# Patient Record
Sex: FEMALE | Race: WHITE | NOT HISPANIC OR LATINO | Employment: OTHER | URBAN - METROPOLITAN AREA
[De-identification: names, ages, dates, MRNs, and addresses within clinical notes are randomized per-mention and may not be internally consistent; named-entity substitution may affect disease eponyms.]

---

## 2017-01-16 ENCOUNTER — HOSPITAL ENCOUNTER (OUTPATIENT)
Dept: LAB | Facility: MEDICAL CENTER | Age: 82
End: 2017-01-16
Attending: NURSE PRACTITIONER
Payer: MEDICARE

## 2017-01-16 LAB
ALBUMIN SERPL BCP-MCNC: 3.9 G/DL (ref 3.2–4.9)
ALBUMIN/GLOB SERPL: 1.3 G/DL
ALP SERPL-CCNC: 58 U/L (ref 30–99)
ALT SERPL-CCNC: 14 U/L (ref 2–50)
ANION GAP SERPL CALC-SCNC: 5 MMOL/L (ref 0–11.9)
AST SERPL-CCNC: 16 U/L (ref 12–45)
BILIRUB SERPL-MCNC: 0.4 MG/DL (ref 0.1–1.5)
BUN SERPL-MCNC: 15 MG/DL (ref 8–22)
CALCIUM SERPL-MCNC: 9 MG/DL (ref 8.5–10.5)
CHLORIDE SERPL-SCNC: 104 MMOL/L (ref 96–112)
CO2 SERPL-SCNC: 29 MMOL/L (ref 20–33)
CREAT SERPL-MCNC: 0.79 MG/DL (ref 0.5–1.4)
GLOBULIN SER CALC-MCNC: 2.9 G/DL (ref 1.9–3.5)
GLUCOSE SERPL-MCNC: 89 MG/DL (ref 65–99)
POTASSIUM SERPL-SCNC: 3.8 MMOL/L (ref 3.6–5.5)
PROT SERPL-MCNC: 6.8 G/DL (ref 6–8.2)
SODIUM SERPL-SCNC: 138 MMOL/L (ref 135–145)

## 2017-01-16 PROCEDURE — 80053 COMPREHEN METABOLIC PANEL: CPT

## 2017-01-16 PROCEDURE — 36415 COLL VENOUS BLD VENIPUNCTURE: CPT

## 2017-04-04 ENCOUNTER — HOSPITAL ENCOUNTER (OUTPATIENT)
Dept: RADIOLOGY | Facility: MEDICAL CENTER | Age: 82
End: 2017-04-04
Attending: NURSE PRACTITIONER
Payer: MEDICARE

## 2017-04-04 DIAGNOSIS — Z12.31 VISIT FOR SCREENING MAMMOGRAM: ICD-10-CM

## 2017-04-04 PROCEDURE — 77063 BREAST TOMOSYNTHESIS BI: CPT

## 2017-07-13 ENCOUNTER — HOSPITAL ENCOUNTER (OUTPATIENT)
Dept: LAB | Facility: MEDICAL CENTER | Age: 82
End: 2017-07-13
Attending: NURSE PRACTITIONER
Payer: MEDICARE

## 2017-07-13 LAB
ALBUMIN SERPL BCP-MCNC: 3.9 G/DL (ref 3.2–4.9)
ALBUMIN/GLOB SERPL: 1.2 G/DL
ALP SERPL-CCNC: 59 U/L (ref 30–99)
ALT SERPL-CCNC: 13 U/L (ref 2–50)
ANION GAP SERPL CALC-SCNC: 9 MMOL/L (ref 0–11.9)
AST SERPL-CCNC: 16 U/L (ref 12–45)
BASOPHILS # BLD AUTO: 0.9 % (ref 0–1.8)
BASOPHILS # BLD: 0.05 K/UL (ref 0–0.12)
BILIRUB SERPL-MCNC: 0.6 MG/DL (ref 0.1–1.5)
BUN SERPL-MCNC: 23 MG/DL (ref 8–22)
CALCIUM SERPL-MCNC: 9.3 MG/DL (ref 8.5–10.5)
CHLORIDE SERPL-SCNC: 102 MMOL/L (ref 96–112)
CHOLEST SERPL-MCNC: 167 MG/DL (ref 100–199)
CO2 SERPL-SCNC: 27 MMOL/L (ref 20–33)
CREAT SERPL-MCNC: 0.85 MG/DL (ref 0.5–1.4)
CREAT UR-MCNC: 91.1 MG/DL
EOSINOPHIL # BLD AUTO: 0.33 K/UL (ref 0–0.51)
EOSINOPHIL NFR BLD: 5.6 % (ref 0–6.9)
ERYTHROCYTE [DISTWIDTH] IN BLOOD BY AUTOMATED COUNT: 39.1 FL (ref 35.9–50)
GFR SERPL CREATININE-BSD FRML MDRD: >60 ML/MIN/1.73 M 2
GLOBULIN SER CALC-MCNC: 3.3 G/DL (ref 1.9–3.5)
GLUCOSE SERPL-MCNC: 103 MG/DL (ref 65–99)
HCT VFR BLD AUTO: 40.1 % (ref 37–47)
HDLC SERPL-MCNC: 51 MG/DL
HGB BLD-MCNC: 13.6 G/DL (ref 12–16)
IMM GRANULOCYTES # BLD AUTO: 0.02 K/UL (ref 0–0.11)
IMM GRANULOCYTES NFR BLD AUTO: 0.3 % (ref 0–0.9)
LDLC SERPL CALC-MCNC: 84 MG/DL
LYMPHOCYTES # BLD AUTO: 1.43 K/UL (ref 1–4.8)
LYMPHOCYTES NFR BLD: 24.3 % (ref 22–41)
MCH RBC QN AUTO: 31.3 PG (ref 27–33)
MCHC RBC AUTO-ENTMCNC: 33.9 G/DL (ref 33.6–35)
MCV RBC AUTO: 92.4 FL (ref 81.4–97.8)
MICROALBUMIN UR-MCNC: <0.7 MG/DL
MICROALBUMIN/CREAT UR: NORMAL MG/G (ref 0–30)
MONOCYTES # BLD AUTO: 0.38 K/UL (ref 0–0.85)
MONOCYTES NFR BLD AUTO: 6.5 % (ref 0–13.4)
NEUTROPHILS # BLD AUTO: 3.67 K/UL (ref 2–7.15)
NEUTROPHILS NFR BLD: 62.4 % (ref 44–72)
NRBC # BLD AUTO: 0 K/UL
NRBC BLD AUTO-RTO: 0 /100 WBC
PLATELET # BLD AUTO: 172 K/UL (ref 164–446)
PMV BLD AUTO: 10.2 FL (ref 9–12.9)
POTASSIUM SERPL-SCNC: 3.7 MMOL/L (ref 3.6–5.5)
PROT SERPL-MCNC: 7.2 G/DL (ref 6–8.2)
RBC # BLD AUTO: 4.34 M/UL (ref 4.2–5.4)
SODIUM SERPL-SCNC: 138 MMOL/L (ref 135–145)
T3FREE SERPL-MCNC: 2.66 PG/ML (ref 2.4–4.2)
T4 FREE SERPL-MCNC: 0.59 NG/DL (ref 0.53–1.43)
TRIGL SERPL-MCNC: 162 MG/DL (ref 0–149)
TSH SERPL DL<=0.005 MIU/L-ACNC: 1.32 UIU/ML (ref 0.3–3.7)
WBC # BLD AUTO: 5.9 K/UL (ref 4.8–10.8)

## 2017-07-13 PROCEDURE — 84439 ASSAY OF FREE THYROXINE: CPT

## 2017-07-13 PROCEDURE — 85025 COMPLETE CBC W/AUTO DIFF WBC: CPT

## 2017-07-13 PROCEDURE — 84443 ASSAY THYROID STIM HORMONE: CPT

## 2017-07-13 PROCEDURE — 80053 COMPREHEN METABOLIC PANEL: CPT

## 2017-07-13 PROCEDURE — 82043 UR ALBUMIN QUANTITATIVE: CPT

## 2017-07-13 PROCEDURE — 84481 FREE ASSAY (FT-3): CPT

## 2017-07-13 PROCEDURE — 80061 LIPID PANEL: CPT

## 2017-07-13 PROCEDURE — 82570 ASSAY OF URINE CREATININE: CPT

## 2017-07-13 PROCEDURE — 36415 COLL VENOUS BLD VENIPUNCTURE: CPT

## 2017-08-10 ENCOUNTER — HOSPITAL ENCOUNTER (OUTPATIENT)
Dept: RADIOLOGY | Facility: REHABILITATION | Age: 82
End: 2017-08-10
Attending: PHYSICAL MEDICINE & REHABILITATION
Payer: MEDICARE

## 2017-08-10 ENCOUNTER — HOSPITAL ENCOUNTER (OUTPATIENT)
Dept: PAIN MANAGEMENT | Facility: REHABILITATION | Age: 82
End: 2017-08-10
Attending: PHYSICAL MEDICINE & REHABILITATION
Payer: MEDICARE

## 2017-08-10 VITALS
HEART RATE: 58 BPM | DIASTOLIC BLOOD PRESSURE: 74 MMHG | TEMPERATURE: 98.6 F | BODY MASS INDEX: 21.93 KG/M2 | SYSTOLIC BLOOD PRESSURE: 161 MMHG | WEIGHT: 131.61 LBS | HEIGHT: 65 IN | RESPIRATION RATE: 16 BRPM | OXYGEN SATURATION: 97 %

## 2017-08-10 PROCEDURE — 700117 HCHG RX CONTRAST REV CODE 255

## 2017-08-10 PROCEDURE — 700111 HCHG RX REV CODE 636 W/ 250 OVERRIDE (IP)

## 2017-08-10 PROCEDURE — 700101 HCHG RX REV CODE 250

## 2017-08-10 PROCEDURE — 62323 NJX INTERLAMINAR LMBR/SAC: CPT

## 2017-08-10 RX ORDER — METHYLPREDNISOLONE ACETATE 80 MG/ML
INJECTION, SUSPENSION INTRA-ARTICULAR; INTRALESIONAL; INTRAMUSCULAR; SOFT TISSUE
Status: COMPLETED
Start: 2017-08-10 | End: 2017-08-10

## 2017-08-10 RX ORDER — METHYLPREDNISOLONE ACETATE 40 MG/ML
INJECTION, SUSPENSION INTRA-ARTICULAR; INTRALESIONAL; INTRAMUSCULAR; SOFT TISSUE
Status: COMPLETED
Start: 2017-08-10 | End: 2017-08-10

## 2017-08-10 RX ORDER — LIDOCAINE HYDROCHLORIDE 20 MG/ML
INJECTION, SOLUTION EPIDURAL; INFILTRATION; INTRACAUDAL; PERINEURAL
Status: COMPLETED
Start: 2017-08-10 | End: 2017-08-10

## 2017-08-10 RX ADMIN — LIDOCAINE HYDROCHLORIDE 2 ML: 20 INJECTION, SOLUTION EPIDURAL; INFILTRATION; INTRACAUDAL; PERINEURAL at 11:11

## 2017-08-10 RX ADMIN — METHYLPREDNISOLONE ACETATE 40 MG: 40 INJECTION, SUSPENSION INTRA-ARTICULAR; INTRALESIONAL; INTRAMUSCULAR; SOFT TISSUE at 11:17

## 2017-08-10 RX ADMIN — LIDOCAINE HYDROCHLORIDE 0.5 ML: 20 INJECTION, SOLUTION EPIDURAL; INFILTRATION; INTRACAUDAL; PERINEURAL at 11:17

## 2017-08-10 RX ADMIN — METHYLPREDNISOLONE ACETATE 80 MG: 80 INJECTION, SUSPENSION INTRA-ARTICULAR; INTRALESIONAL; INTRAMUSCULAR; SOFT TISSUE at 11:15

## 2017-08-10 RX ADMIN — IOHEXOL 1 ML: 240 INJECTION, SOLUTION INTRATHECAL; INTRAVASCULAR; INTRAVENOUS; ORAL at 11:13

## 2017-08-10 RX ADMIN — SODIUM BICARBONATE 1 MEQ: 0.2 INJECTION, SOLUTION INTRAVENOUS at 11:11

## 2017-08-10 ASSESSMENT — PAIN SCALES - GENERAL
PAINLEVEL_OUTOF10: 0
PAINLEVEL_OUTOF10: 0
PAINLEVEL_OUTOF10: 8

## 2017-08-10 NOTE — PROGRESS NOTES
Patient positioned by RN   CST  X- ray Tech. . Ankle supported on pillow. Arms supported by stool at head of the bed.

## 2017-08-10 NOTE — PROCEDURES
DATE OF SERVICE:    NAME OF PROCEDURE:  Right L5-S1 intralaminar epidural steroid injection with   120 mg of Depo-Medrol under fluoroscopic guidance.    INDICATION:  The patient is an 82-year-old patient [facility].    She has previously had epidurals with good success, most recent was in   December 2015.    Given her return of symptoms, Dr. Ontiveros has recommended a followup epidural   and her symptoms predominate on the right side, so we entered from the right   side for her today's procedure.    DESCRIPTION OF PROCEDURE:  After appropriate informed consent was obtained,   the patient was placed prone on the table.  Her skin was thoroughly cleansed   with Betadine soap x3, wiped off with sterile gauze then subcutaneous,   intramuscular, and interligamentous region was anesthetized with lidocaine.    A 3.5-inch 20-gauge Tuohy catheter was directed under fluoroscopic guidance at   the lamina.  Once the lamina was detected, the catheter was directed cephalad   medially and loss of resistance technique was used to determine the epidural   space.    EPIDUROGRAM:  1 mL of Omnipaque was placed just right of midline at L5-S1.    This was viewed on AP and lateral projection and proved to be subligamentous   with good cephalad and caudad flow, and the flow was unrestricted.    Depo-Medrol 120 mg along with 0.5 mL of 2% lidocaine was placed just right of   midline at L5-S1.    The patient tolerated the procedure without any obvious complications.  She   was referred to the recovery area, at which point she was doing well, noticing   improvement in her pain.    Hopefully, she will have an ongoing therapeutic response.    The patient will follow up in the office next week to monitor response to   injection.       ____________________________________     MD CARY SOSA / TRINITY    DD:  08/10/2017 11:52:09  DT:  08/10/2017 12:02:20    D#:  [id number]  Job#:  [id number]    cc: Jens Ontiveros MD, KAELYN FELIX MD

## 2017-08-10 NOTE — PROGRESS NOTES
Current medications reviewed with pt, see medications reconciliation form. Pt martha taking ASA or other blood thinners or anti-inflammatories.  Pt has a ride post-procedure(Pt going home via taxi).  Printed and verbal discharge instructions given to pt who verbalized understanding.

## 2018-01-12 ENCOUNTER — HOSPITAL ENCOUNTER (OUTPATIENT)
Dept: LAB | Facility: MEDICAL CENTER | Age: 83
End: 2018-01-12
Attending: FAMILY MEDICINE
Payer: MEDICARE

## 2018-01-12 LAB
ALBUMIN SERPL BCP-MCNC: 4.2 G/DL (ref 3.2–4.9)
ALBUMIN/GLOB SERPL: 1.4 G/DL
ALP SERPL-CCNC: 64 U/L (ref 30–99)
ALT SERPL-CCNC: 15 U/L (ref 2–50)
ANION GAP SERPL CALC-SCNC: 8 MMOL/L (ref 0–11.9)
AST SERPL-CCNC: 18 U/L (ref 12–45)
BASOPHILS # BLD AUTO: 0.9 % (ref 0–1.8)
BASOPHILS # BLD: 0.04 K/UL (ref 0–0.12)
BILIRUB SERPL-MCNC: 0.7 MG/DL (ref 0.1–1.5)
BUN SERPL-MCNC: 17 MG/DL (ref 8–22)
CALCIUM SERPL-MCNC: 9.5 MG/DL (ref 8.5–10.5)
CHLORIDE SERPL-SCNC: 102 MMOL/L (ref 96–112)
CHOLEST SERPL-MCNC: 204 MG/DL (ref 100–199)
CO2 SERPL-SCNC: 30 MMOL/L (ref 20–33)
CREAT SERPL-MCNC: 0.84 MG/DL (ref 0.5–1.4)
CREAT UR-MCNC: 30 MG/DL
EOSINOPHIL # BLD AUTO: 0.15 K/UL (ref 0–0.51)
EOSINOPHIL NFR BLD: 3.3 % (ref 0–6.9)
ERYTHROCYTE [DISTWIDTH] IN BLOOD BY AUTOMATED COUNT: 39.3 FL (ref 35.9–50)
EST. AVERAGE GLUCOSE BLD GHB EST-MCNC: 111 MG/DL
GLOBULIN SER CALC-MCNC: 3 G/DL (ref 1.9–3.5)
GLUCOSE SERPL-MCNC: 87 MG/DL (ref 65–99)
HBA1C MFR BLD: 5.5 % (ref 0–5.6)
HCT VFR BLD AUTO: 42.4 % (ref 37–47)
HDLC SERPL-MCNC: 67 MG/DL
HGB BLD-MCNC: 14.4 G/DL (ref 12–16)
IMM GRANULOCYTES # BLD AUTO: 0.01 K/UL (ref 0–0.11)
IMM GRANULOCYTES NFR BLD AUTO: 0.2 % (ref 0–0.9)
LDLC SERPL CALC-MCNC: 102 MG/DL
LYMPHOCYTES # BLD AUTO: 1.44 K/UL (ref 1–4.8)
LYMPHOCYTES NFR BLD: 32.1 % (ref 22–41)
MCH RBC QN AUTO: 32.2 PG (ref 27–33)
MCHC RBC AUTO-ENTMCNC: 34 G/DL (ref 33.6–35)
MCV RBC AUTO: 94.9 FL (ref 81.4–97.8)
MICROALBUMIN UR-MCNC: <0.7 MG/DL
MICROALBUMIN/CREAT UR: NORMAL MG/G (ref 0–30)
MONOCYTES # BLD AUTO: 0.36 K/UL (ref 0–0.85)
MONOCYTES NFR BLD AUTO: 8 % (ref 0–13.4)
NEUTROPHILS # BLD AUTO: 2.49 K/UL (ref 2–7.15)
NEUTROPHILS NFR BLD: 55.5 % (ref 44–72)
NRBC # BLD AUTO: 0 K/UL
NRBC BLD-RTO: 0 /100 WBC
PLATELET # BLD AUTO: 173 K/UL (ref 164–446)
PMV BLD AUTO: 10.5 FL (ref 9–12.9)
POTASSIUM SERPL-SCNC: 3.8 MMOL/L (ref 3.6–5.5)
PROT SERPL-MCNC: 7.2 G/DL (ref 6–8.2)
RBC # BLD AUTO: 4.47 M/UL (ref 4.2–5.4)
SODIUM SERPL-SCNC: 140 MMOL/L (ref 135–145)
TRIGL SERPL-MCNC: 175 MG/DL (ref 0–149)
TSH SERPL DL<=0.005 MIU/L-ACNC: 2.63 UIU/ML (ref 0.38–5.33)
WBC # BLD AUTO: 4.5 K/UL (ref 4.8–10.8)

## 2018-01-12 PROCEDURE — 80061 LIPID PANEL: CPT

## 2018-01-12 PROCEDURE — 82043 UR ALBUMIN QUANTITATIVE: CPT

## 2018-01-12 PROCEDURE — 84443 ASSAY THYROID STIM HORMONE: CPT

## 2018-01-12 PROCEDURE — 83036 HEMOGLOBIN GLYCOSYLATED A1C: CPT

## 2018-01-12 PROCEDURE — 85025 COMPLETE CBC W/AUTO DIFF WBC: CPT

## 2018-01-12 PROCEDURE — 36415 COLL VENOUS BLD VENIPUNCTURE: CPT

## 2018-01-12 PROCEDURE — 82570 ASSAY OF URINE CREATININE: CPT

## 2018-01-12 PROCEDURE — 80053 COMPREHEN METABOLIC PANEL: CPT

## 2018-04-09 ENCOUNTER — PATIENT OUTREACH (OUTPATIENT)
Dept: HEALTH INFORMATION MANAGEMENT | Facility: OTHER | Age: 83
End: 2018-04-09

## 2018-04-09 NOTE — PROGRESS NOTES
Attempt #:Final  [facility] Verified: yes  Verify PCP: yes    Communication Preference Obtained: yes     Annual Wellness Visit Scheduling  1. Scheduling Status:Not scheduled/ will contact PCP to schedule appt / Pt sees Dr. Saavedra in [place].        Care Gap Scheduling (Attempt to Schedule EACH Overdue Care Gap!)  Health Maintenance Due   Topic Date Due   • Annual Wellness Visit  1935   • IMM DTaP/Tdap/Td Vaccine (1 - Tdap) 07/20/1954   • PAP SMEAR  07/20/1956   • COLONOSCOPY  07/20/1985   • IMM ZOSTER VACCINE  07/20/1995   • IMM PNEUMOCOCCAL 65+ (ADULT) LOW/MEDIUM RISK SERIES (1 of 2 - PCV13) 07/20/2000   • MAMMOGRAM  04/04/2018       MyChart Activation: declined

## 2018-04-17 ENCOUNTER — HOSPITAL ENCOUNTER (OUTPATIENT)
Dept: RADIOLOGY | Facility: MEDICAL CENTER | Age: 83
End: 2018-04-17
Attending: FAMILY MEDICINE
Payer: MEDICARE

## 2018-04-17 DIAGNOSIS — Z12.31 ENCOUNTER FOR SCREENING MAMMOGRAM FOR MALIGNANT NEOPLASM OF BREAST: ICD-10-CM

## 2018-04-17 PROCEDURE — 77067 SCR MAMMO BI INCL CAD: CPT

## 2018-04-25 NOTE — PROGRESS NOTES
PAT note: PAT call made 04/25/2018 at 1028. Spoke with Елена. Pt was unable to speak at this time. (another appt). Will try later in the day.

## 2018-04-26 ENCOUNTER — HOSPITAL ENCOUNTER (OUTPATIENT)
Dept: RADIOLOGY | Facility: REHABILITATION | Age: 83
End: 2018-04-26
Attending: PHYSICAL MEDICINE & REHABILITATION
Payer: MEDICARE

## 2018-04-26 ENCOUNTER — HOSPITAL ENCOUNTER (OUTPATIENT)
Dept: PAIN MANAGEMENT | Facility: REHABILITATION | Age: 83
End: 2018-04-26
Attending: PHYSICAL MEDICINE & REHABILITATION
Payer: MEDICARE

## 2018-04-26 VITALS
HEIGHT: 65 IN | RESPIRATION RATE: 16 BRPM | WEIGHT: 134.48 LBS | OXYGEN SATURATION: 95 % | HEART RATE: 64 BPM | BODY MASS INDEX: 22.41 KG/M2 | TEMPERATURE: 98.1 F | SYSTOLIC BLOOD PRESSURE: 162 MMHG | DIASTOLIC BLOOD PRESSURE: 80 MMHG

## 2018-04-26 PROCEDURE — 700117 HCHG RX CONTRAST REV CODE 255

## 2018-04-26 PROCEDURE — 700101 HCHG RX REV CODE 250

## 2018-04-26 PROCEDURE — 62323 NJX INTERLAMINAR LMBR/SAC: CPT

## 2018-04-26 PROCEDURE — 700111 HCHG RX REV CODE 636 W/ 250 OVERRIDE (IP)

## 2018-04-26 RX ORDER — NALOXONE HYDROCHLORIDE 0.4 MG/ML
INJECTION, SOLUTION INTRAMUSCULAR; INTRAVENOUS; SUBCUTANEOUS
Status: COMPLETED
Start: 2018-04-26 | End: 2018-04-26

## 2018-04-26 RX ORDER — FLUMAZENIL 0.1 MG/ML
INJECTION INTRAVENOUS
Status: COMPLETED
Start: 2018-04-26 | End: 2018-04-26

## 2018-04-26 RX ORDER — METHYLPREDNISOLONE ACETATE 80 MG/ML
INJECTION, SUSPENSION INTRA-ARTICULAR; INTRALESIONAL; INTRAMUSCULAR; SOFT TISSUE
Status: COMPLETED
Start: 2018-04-26 | End: 2018-04-26

## 2018-04-26 RX ORDER — LIDOCAINE HYDROCHLORIDE 20 MG/ML
INJECTION, SOLUTION EPIDURAL; INFILTRATION; INTRACAUDAL; PERINEURAL
Status: COMPLETED
Start: 2018-04-26 | End: 2018-04-26

## 2018-04-26 RX ADMIN — LIDOCAINE HYDROCHLORIDE 3 ML: 20 INJECTION, SOLUTION EPIDURAL; INFILTRATION; INTRACAUDAL; PERINEURAL at 08:17

## 2018-04-26 RX ADMIN — SODIUM BICARBONATE 1 MEQ: 0.2 INJECTION, SOLUTION INTRAVENOUS at 08:17

## 2018-04-26 RX ADMIN — IOHEXOL 1 ML: 240 INJECTION, SOLUTION INTRATHECAL; INTRAVASCULAR; INTRAVENOUS; ORAL at 08:21

## 2018-04-26 RX ADMIN — METHYLPREDNISOLONE ACETATE 80 MG: 80 INJECTION, SUSPENSION INTRA-ARTICULAR; INTRALESIONAL; INTRAMUSCULAR; SOFT TISSUE at 08:23

## 2018-04-26 RX ADMIN — LIDOCAINE HYDROCHLORIDE 0.5 ML: 20 INJECTION, SOLUTION EPIDURAL; INFILTRATION; INTRACAUDAL; PERINEURAL at 08:23

## 2018-04-26 ASSESSMENT — PAIN SCALES - GENERAL
PAINLEVEL_OUTOF10: 8
PAINLEVEL_OUTOF10: 0
PAINLEVEL_OUTOF10: 0

## 2018-04-26 NOTE — PROGRESS NOTES
Positioned patient by ,CNA, RN, X - ray Tech. Both lower legs & feet pillow placed for support. Hands supported on stool under head of the bed. Procedure tolerated well by patient. Accompanied to recovery room, ambulatory.

## 2018-04-26 NOTE — PROCEDURES
DATE OF SERVICE:  04/26/2018    NAME OF PROCEDURE:  Right L5-S1 intralaminar epidural steroid injection with   80 mg of Depo-Medrol under fluoroscopic guidance.    INDICATION:  The patient is an 82-year-old patient of Dr. Ontiveros, who has   previously had an epidural, most recently little more than 6 months ago on   08/10/2017.    She did have a return of symptoms, so Dr. Ontiveros recommended a followup lumbar   epidural.    DESCRIPTION OF PROCEDURE:  After appropriate informed consent was obtained,   the patient was placed prone on the table.  Her skin was thoroughly cleansed   with Betadine soap x3, wiped off with sterile gauze.  The subcutaneous,   intramuscular, and interligamentous regions were anesthetized with lidocaine.    A 3.5-inch 20-gauge Tuohy catheter was directed under fluoroscopic guidance at   the lamina.  Once the lamina was detected, the catheter was directed cephalad   medially and loss of resistance technique was used to determine the epidural   space.    EPIDUROGRAM:  1 mL of Omnipaque was placed just right of midline at L5-S1.    This was viewed on the AP and lateral projection and showed a good   subligamentous flow.  There was lack of progression cephalad suspected to be   due to spinal stenosis.    80 mg of Depo-Medrol along with 0.5 mL of 2% lidocaine was placed at the L5-S1   level.    Patient tolerated the procedure without procedure complications.  She was   referred to the recovery area at which point she was doing well and noticing   some immediate relief.    She will follow up in the office with Dr. Ontiveros in the next few weeks to   monitor response to injection.       ____________________________________     MD CARY SOSA / TRINITY    DD:  04/26/2018 12:00:15  DT:  04/26/2018 13:02:24    D#:  [id number]  Job#:  [id number]    cc: Jens Ontiveros MD, KAELYN FELIX MD

## 2018-04-26 NOTE — PROGRESS NOTES
Current meds. See medication reconciliation form. Reviewed with pt. Pt denies taking ASA,other blood thinners or anti-inflammatories. Pt has a ride post-procedure (taxi is ). Printed and verbal discharge instructions given to pt who verbalized understanding.

## 2018-07-05 ENCOUNTER — HOSPITAL ENCOUNTER (OUTPATIENT)
Dept: LAB | Facility: MEDICAL CENTER | Age: 83
End: 2018-07-05
Attending: FAMILY MEDICINE
Payer: MEDICARE

## 2018-07-05 LAB
ALBUMIN SERPL BCP-MCNC: 4 G/DL (ref 3.2–4.9)
ALBUMIN/GLOB SERPL: 1.3 G/DL
ALP SERPL-CCNC: 58 U/L (ref 30–99)
ALT SERPL-CCNC: 15 U/L (ref 2–50)
ANION GAP SERPL CALC-SCNC: 7 MMOL/L (ref 0–11.9)
AST SERPL-CCNC: 16 U/L (ref 12–45)
BASOPHILS # BLD AUTO: 1.2 % (ref 0–1.8)
BASOPHILS # BLD: 0.06 K/UL (ref 0–0.12)
BILIRUB SERPL-MCNC: 0.5 MG/DL (ref 0.1–1.5)
BUN SERPL-MCNC: 23 MG/DL (ref 8–22)
CALCIUM SERPL-MCNC: 9.2 MG/DL (ref 8.5–10.5)
CHLORIDE SERPL-SCNC: 102 MMOL/L (ref 96–112)
CHOLEST SERPL-MCNC: 194 MG/DL (ref 100–199)
CO2 SERPL-SCNC: 29 MMOL/L (ref 20–33)
CREAT SERPL-MCNC: 0.79 MG/DL (ref 0.5–1.4)
CREAT UR-MCNC: 61.2 MG/DL
EOSINOPHIL # BLD AUTO: 0.21 K/UL (ref 0–0.51)
EOSINOPHIL NFR BLD: 4.2 % (ref 0–6.9)
ERYTHROCYTE [DISTWIDTH] IN BLOOD BY AUTOMATED COUNT: 42 FL (ref 35.9–50)
EST. AVERAGE GLUCOSE BLD GHB EST-MCNC: 117 MG/DL
GLOBULIN SER CALC-MCNC: 3.2 G/DL (ref 1.9–3.5)
GLUCOSE SERPL-MCNC: 95 MG/DL (ref 65–99)
HBA1C MFR BLD: 5.7 % (ref 0–5.6)
HCT VFR BLD AUTO: 41 % (ref 37–47)
HDLC SERPL-MCNC: 63 MG/DL
HGB BLD-MCNC: 13.6 G/DL (ref 12–16)
IMM GRANULOCYTES # BLD AUTO: 0.01 K/UL (ref 0–0.11)
IMM GRANULOCYTES NFR BLD AUTO: 0.2 % (ref 0–0.9)
LDLC SERPL CALC-MCNC: 100 MG/DL
LYMPHOCYTES # BLD AUTO: 1.4 K/UL (ref 1–4.8)
LYMPHOCYTES NFR BLD: 27.7 % (ref 22–41)
MCH RBC QN AUTO: 31.5 PG (ref 27–33)
MCHC RBC AUTO-ENTMCNC: 33.2 G/DL (ref 33.6–35)
MCV RBC AUTO: 94.9 FL (ref 81.4–97.8)
MICROALBUMIN UR-MCNC: 0.7 MG/DL
MICROALBUMIN/CREAT UR: 11 MG/G (ref 0–30)
MONOCYTES # BLD AUTO: 0.41 K/UL (ref 0–0.85)
MONOCYTES NFR BLD AUTO: 8.1 % (ref 0–13.4)
NEUTROPHILS # BLD AUTO: 2.96 K/UL (ref 2–7.15)
NEUTROPHILS NFR BLD: 58.6 % (ref 44–72)
NRBC # BLD AUTO: 0 K/UL
NRBC BLD-RTO: 0 /100 WBC
PLATELET # BLD AUTO: 169 K/UL (ref 164–446)
PMV BLD AUTO: 10.2 FL (ref 9–12.9)
POTASSIUM SERPL-SCNC: 3.9 MMOL/L (ref 3.6–5.5)
PROT SERPL-MCNC: 7.2 G/DL (ref 6–8.2)
RBC # BLD AUTO: 4.32 M/UL (ref 4.2–5.4)
SODIUM SERPL-SCNC: 138 MMOL/L (ref 135–145)
TRIGL SERPL-MCNC: 155 MG/DL (ref 0–149)
TSH SERPL DL<=0.005 MIU/L-ACNC: 2.08 UIU/ML (ref 0.38–5.33)
WBC # BLD AUTO: 5.1 K/UL (ref 4.8–10.8)

## 2018-07-05 PROCEDURE — 85025 COMPLETE CBC W/AUTO DIFF WBC: CPT

## 2018-07-05 PROCEDURE — 80061 LIPID PANEL: CPT

## 2018-07-05 PROCEDURE — 82043 UR ALBUMIN QUANTITATIVE: CPT

## 2018-07-05 PROCEDURE — 82570 ASSAY OF URINE CREATININE: CPT

## 2018-07-05 PROCEDURE — 80053 COMPREHEN METABOLIC PANEL: CPT

## 2018-07-05 PROCEDURE — 83036 HEMOGLOBIN GLYCOSYLATED A1C: CPT

## 2018-07-05 PROCEDURE — 84443 ASSAY THYROID STIM HORMONE: CPT

## 2018-07-05 PROCEDURE — 36415 COLL VENOUS BLD VENIPUNCTURE: CPT

## 2018-12-14 ENCOUNTER — OFFICE VISIT (OUTPATIENT)
Dept: MEDICAL GROUP | Facility: MEDICAL CENTER | Age: 83
End: 2018-12-14
Payer: MEDICARE

## 2018-12-14 VITALS
SYSTOLIC BLOOD PRESSURE: 130 MMHG | OXYGEN SATURATION: 96 % | HEIGHT: 65 IN | RESPIRATION RATE: 16 BRPM | HEART RATE: 68 BPM | BODY MASS INDEX: 22.49 KG/M2 | DIASTOLIC BLOOD PRESSURE: 68 MMHG | TEMPERATURE: 96.4 F | WEIGHT: 135 LBS

## 2018-12-14 DIAGNOSIS — R73.01 IFG (IMPAIRED FASTING GLUCOSE): ICD-10-CM

## 2018-12-14 DIAGNOSIS — E87.6 HYPOKALEMIA: Chronic | ICD-10-CM

## 2018-12-14 DIAGNOSIS — E78.5 DYSLIPIDEMIA: ICD-10-CM

## 2018-12-14 DIAGNOSIS — I10 ESSENTIAL HYPERTENSION: ICD-10-CM

## 2018-12-14 PROBLEM — D70.9 NEUTROPENIA (HCC): Status: RESOLVED | Noted: 2018-12-14 | Resolved: 2018-12-14

## 2018-12-14 PROBLEM — N18.30 CHRONIC RENAL INSUFFICIENCY, STAGE III (MODERATE): Status: RESOLVED | Noted: 2018-12-14 | Resolved: 2018-12-14

## 2018-12-14 PROBLEM — D72.819 LEUKOPENIA: Status: RESOLVED | Noted: 2018-12-14 | Resolved: 2018-12-14

## 2018-12-14 PROBLEM — M93.90 OSTEOCHONDROPATHY: Status: ACTIVE | Noted: 2018-12-14

## 2018-12-14 PROBLEM — N28.9 ABNORMAL RENAL FUNCTION: Status: ACTIVE | Noted: 2018-12-14

## 2018-12-14 PROBLEM — N18.30 CHRONIC RENAL INSUFFICIENCY, STAGE III (MODERATE): Status: ACTIVE | Noted: 2018-12-14

## 2018-12-14 PROBLEM — D72.819 LEUKOPENIA: Status: ACTIVE | Noted: 2018-12-14

## 2018-12-14 PROBLEM — D70.9 NEUTROPENIA (HCC): Status: ACTIVE | Noted: 2018-12-14

## 2018-12-14 PROBLEM — E78.2 MIXED HYPERLIPIDEMIA: Status: ACTIVE | Noted: 2018-12-14

## 2018-12-14 PROCEDURE — 99203 OFFICE O/P NEW LOW 30 MIN: CPT | Performed by: NURSE PRACTITIONER

## 2018-12-14 RX ORDER — POTASSIUM CHLORIDE 750 MG/1
20 TABLET, FILM COATED, EXTENDED RELEASE ORAL 3 TIMES DAILY
Qty: 180 TAB | Refills: 6 | Status: SHIPPED | OUTPATIENT
Start: 2018-12-14 | End: 2019-04-05 | Stop reason: SDUPTHER

## 2018-12-14 RX ORDER — AMLODIPINE BESYLATE 5 MG/1
5 TABLET ORAL DAILY
Qty: 30 TAB | Refills: 6 | Status: SHIPPED | OUTPATIENT
Start: 2018-12-14 | End: 2019-04-05 | Stop reason: SDUPTHER

## 2018-12-14 RX ORDER — HYDROCHLOROTHIAZIDE 25 MG/1
25 TABLET ORAL DAILY
Qty: 30 TAB | Refills: 6 | Status: SHIPPED | OUTPATIENT
Start: 2018-12-14 | End: 2019-04-05 | Stop reason: SDUPTHER

## 2018-12-14 RX ORDER — POTASSIUM CHLORIDE 750 MG/1
20 TABLET, FILM COATED, EXTENDED RELEASE ORAL 3 TIMES DAILY
Qty: 90 TAB | Refills: 6 | Status: SHIPPED | OUTPATIENT
Start: 2018-12-14 | End: 2018-12-14 | Stop reason: SDUPTHER

## 2018-12-14 RX ORDER — LISINOPRIL 40 MG/1
40 TABLET ORAL DAILY
Qty: 30 TAB | Refills: 6 | Status: SHIPPED | OUTPATIENT
Start: 2018-12-14 | End: 2019-04-05 | Stop reason: SDUPTHER

## 2018-12-14 RX ORDER — METOPROLOL SUCCINATE 50 MG/1
50 TABLET, EXTENDED RELEASE ORAL 2 TIMES DAILY
Qty: 60 TAB | Refills: 6 | Status: SHIPPED | OUTPATIENT
Start: 2018-12-14 | End: 2019-01-21 | Stop reason: CLARIF

## 2018-12-14 NOTE — PROGRESS NOTES
"CC: establish care/ medication refills      Елена Perla is a 83 y.o. female here to establish care and to discuss the evaluation and management of:    Former patient of Dr. Ellen Saavedra    Blood pressure  Patient states she takes amlodipine, hydrochlorothiazide, lisinopril and metoprolol for her blood pressure.  She does not check her blood pressure at home.  Denies any shortness of breath, chest pain, dizziness or leg swelling.      Back pain  Patient states that she has been getting epidurals from [facility].  Her last epidural was on April of this year.  She has had a history of 11 epidurals.  She states she has at least 2 herniated disks.  She has never had surgery on her back.  She states that usually in the mornings both of her feet are a little bit numb and \"prickly.\" States that this will resolve after walking around.     Low potassium  Patient states that she has low potassium and she takes potassium supplements for this.    Cholesterol  Patient states that she has slightly elevated cholesterol.  She does not take any medications for this.  She is active.    Elevated A1c  Patient does have a history of having elevated fasting glucose with a slightly elevated A1c at 5.7%.  This was last done in July 2018.  She does have a strong family history of diabetes.  Denies any increased thirst or urination.      Patient states she is never had a colonoscopy or FIT test.      ROS:  Denies any Headache, Blurred Vision, Confusion, Chest pain,  Shortness of breath,  Abdominal pain, Changes of bowel or bladder, Hematuria, Hematochezia, Lower ext. edema, Fevers, Nights sweats, Weight Changes, Focal weakness or numbness.  And all other systems are negative.      Current Outpatient Prescriptions:   •  Potassium 99 MG Tab, Take 1 Tab by mouth. 4 tabs a day, Disp: , Rfl:   •  amLODIPine (NORVASC) 5 MG Tab, Take 1 Tab by mouth every day., Disp: 30 Tab, Rfl: 6  •  hydroCHLOROthiazide (HYDRODIURIL) 25 MG Tab, Take " 1 Tab by mouth every day., Disp: 30 Tab, Rfl: 6  •  lisinopril (PRINIVIL, ZESTRIL) 40 MG tablet, Take 1 Tab by mouth every day., Disp: 30 Tab, Rfl: 6  •  metoprolol SR (TOPROL XL) 50 MG TABLET SR 24 HR, Take 1 Tab by mouth 2 Times a Day., Disp: 60 Tab, Rfl: 6  •  potassium chloride ER (KLOR-CON 10) 10 MEQ tablet, Take 2 Tabs by mouth 3 times a day., Disp: 180 Tab, Rfl: 6  •  VITAMIN A PO, Take  by mouth., Disp: , Rfl:   •  Multiple Vitamins-Minerals (OCUVITE) TABS, Take 1 Tab by mouth every day., Disp: , Rfl:   •  therapeutic multivitamin-minerals (THERAGRAN-M) TABS, Take 1 Tab by mouth every day., Disp: , Rfl:   •  Calcium Carb-Cholecalciferol (CALCIUM + D3) 600-200 MG-UNIT TABS, Take  by mouth. 5 daily, Disp: , Rfl:   •  Magnesium 250 MG TABS, Take  by mouth every day., Disp: , Rfl:   •  Cyanocobalamin (VITAMIN B-12) 5000 MCG SUBL, Place  under tongue every day., Disp: , Rfl:   •  Garlic 1000 MG CAPS, Take  by mouth every day., Disp: , Rfl:   •  Cholecalciferol (VITAMIN D3) 5000 UNITS CAPS, Take 1 Cap by mouth every day., Disp: , Rfl:   •  Omega-3 Fatty Acids (FISH OIL) 1200 MG CAPS, Take  by mouth. 5 caps daily, Disp: , Rfl:     Allergies   Allergen Reactions   • Percodan  [Kdc:Yellow Dye+Aspirin+Oxycodone] Vomiting       Past Medical History:   Diagnosis Date   • Back pain    • Hypertension    • Leukopenia 12/14/2018   • Neutropenia (HCC) 12/14/2018   • Unspecified cataract      Past Surgical History:   Procedure Laterality Date   • CATARACT PHACO WITH IOL  9/10/2014    Performed by Thanh Lloyd M.D. at SURGERY SAME DAY [facility] ORS   • CATARACT PHACO WITH IOL  8/27/2014    Performed by Thanh Lloyd M.D. at SURGERY SAME DAY [facility] ORS   • CHOLECYSTECTOMY  12/4/2008    lap arturo   • HYSTERECTOMY, TOTAL ABDOMINAL       History reviewed. No pertinent family history.  Social History     Social History   • Marital status: Single     Spouse name: N/A   • Number of children: N/A   • Years of education: N/A  "    Occupational History   • Not on file.     Social History Main Topics   • Smoking status: Never Smoker   • Smokeless tobacco: Never Used   • Alcohol use No   • Drug use: No   • Sexual activity: Not on file     Other Topics Concern   • Not on file     Social History Narrative   • No narrative on file       Objective:     Vitals: /68   Pulse 68   Temp (!) 35.8 °C (96.4 °F)   Resp 16   Ht 1.651 m (5' 5\")   Wt 61.2 kg (135 lb)   SpO2 96%   BMI 22.47 kg/m²      General: Alert, pleasant, NAD  HEENT:  Normocephalic.    Heart:  Regular rate and rhythm.  S1 and S2 normal.  No murmurs appreciated.    Respiratory:  Normal respiratory effort.  Clear to auscultation bilaterally.    Skin:  Warm, dry, no rashes  Musculoskeletal:  Gait is normal.  Moves all extremities well.  Extremities:   No leg edema.  Neurological: No tremors, sensation grossly intact  Psych:  Affect/mood is normal, judgement is good, memory is intact, grooming is appropriate.      Assessment and Plan.   83 y.o. female to establish care and discuss the following    1. Essential hypertension  Chronic.  Blood pressure in clinic today was 130/68.  Denies any chest pain, shortness of breath or dizziness.  Denies any heart palpitations or leg swelling.  Needs a refill on her medications.  Last CMP was July 2018.  GFR was within normal.    - amLODIPine (NORVASC) 5 MG Tab; Take 1 Tab by mouth every day.  Dispense: 30 Tab; Refill: 6  - hydroCHLOROthiazide (HYDRODIURIL) 25 MG Tab; Take 1 Tab by mouth every day.  Dispense: 30 Tab; Refill: 6  - lisinopril (PRINIVIL, ZESTRIL) 40 MG tablet; Take 1 Tab by mouth every day.  Dispense: 30 Tab; Refill: 6  - metoprolol SR (TOPROL XL) 50 MG TABLET SR 24 HR; Take 1 Tab by mouth 2 Times a Day.  Dispense: 60 Tab; Refill: 6  - COMP METABOLIC PANEL; Future    2. IFG (impaired fasting glucose)  A1c on last labs in July was 5.7%.  Will repeat.  Discussed with patient that she does have a strong family history of diabetes " his mother's.  She is active and does watch her diet.  - HEMOGLOBIN A1C; Future    3. Dyslipidemia  Stable.  Not on any medications.  Last lipid panel was in July.  Triglyceride 155, .  Total cholesterol 194.  HDLs at 63.  Continue to encourage heart healthy diet.  - Lipid Profile; Future    4. Hypokalemia  Stable.  Last potassium was 3.9 on July 2018 labs.  Continue potassium supplement.  - potassium chloride ER (KLOR-CON 10) 10 MEQ tablet; Take 2 Tabs by mouth 3 times a day.  Dispense: 180 Tab; Refill: 6    Other orders  - Potassium 99 MG Tab; Take 1 Tab by mouth. 4 tabs a day      Return in about 4 weeks (around 1/11/2019) for Lab results.          Miya MCPHERSON.

## 2019-01-21 DIAGNOSIS — I10 ESSENTIAL HYPERTENSION: ICD-10-CM

## 2019-01-21 RX ORDER — METOPROLOL TARTRATE 50 MG/1
50 TABLET, FILM COATED ORAL 2 TIMES DAILY
Qty: 60 TAB | Refills: 6 | Status: SHIPPED | OUTPATIENT
Start: 2019-01-21 | End: 2019-04-05 | Stop reason: SDUPTHER

## 2019-03-21 ENCOUNTER — HOSPITAL ENCOUNTER (OUTPATIENT)
Dept: LAB | Facility: MEDICAL CENTER | Age: 84
End: 2019-03-21
Attending: NURSE PRACTITIONER
Payer: MEDICARE

## 2019-03-21 DIAGNOSIS — I10 ESSENTIAL HYPERTENSION: ICD-10-CM

## 2019-03-21 DIAGNOSIS — R73.01 IFG (IMPAIRED FASTING GLUCOSE): ICD-10-CM

## 2019-03-21 DIAGNOSIS — E78.5 DYSLIPIDEMIA: ICD-10-CM

## 2019-03-21 LAB
ALBUMIN SERPL BCP-MCNC: 4.2 G/DL (ref 3.2–4.9)
ALBUMIN/GLOB SERPL: 1.8 G/DL
ALP SERPL-CCNC: 75 U/L (ref 30–99)
ALT SERPL-CCNC: 18 U/L (ref 2–50)
ANION GAP SERPL CALC-SCNC: 8 MMOL/L (ref 0–11.9)
AST SERPL-CCNC: 21 U/L (ref 12–45)
BILIRUB SERPL-MCNC: 0.5 MG/DL (ref 0.1–1.5)
BUN SERPL-MCNC: 21 MG/DL (ref 8–22)
CALCIUM SERPL-MCNC: 9.2 MG/DL (ref 8.5–10.5)
CHLORIDE SERPL-SCNC: 103 MMOL/L (ref 96–112)
CHOLEST SERPL-MCNC: 187 MG/DL (ref 100–199)
CO2 SERPL-SCNC: 29 MMOL/L (ref 20–33)
CREAT SERPL-MCNC: 0.74 MG/DL (ref 0.5–1.4)
FASTING STATUS PATIENT QL REPORTED: NORMAL
GLOBULIN SER CALC-MCNC: 2.4 G/DL (ref 1.9–3.5)
GLUCOSE SERPL-MCNC: 83 MG/DL (ref 65–99)
HDLC SERPL-MCNC: 51 MG/DL
LDLC SERPL CALC-MCNC: 81 MG/DL
POTASSIUM SERPL-SCNC: 3.7 MMOL/L (ref 3.6–5.5)
PROT SERPL-MCNC: 6.6 G/DL (ref 6–8.2)
SODIUM SERPL-SCNC: 140 MMOL/L (ref 135–145)
TRIGL SERPL-MCNC: 275 MG/DL (ref 0–149)

## 2019-03-21 PROCEDURE — 80053 COMPREHEN METABOLIC PANEL: CPT

## 2019-03-21 PROCEDURE — 83036 HEMOGLOBIN GLYCOSYLATED A1C: CPT

## 2019-03-21 PROCEDURE — 80061 LIPID PANEL: CPT

## 2019-03-21 PROCEDURE — 36415 COLL VENOUS BLD VENIPUNCTURE: CPT

## 2019-03-22 LAB
EST. AVERAGE GLUCOSE BLD GHB EST-MCNC: 120 MG/DL
HBA1C MFR BLD: 5.8 % (ref 0–5.6)

## 2019-04-05 ENCOUNTER — OFFICE VISIT (OUTPATIENT)
Dept: MEDICAL GROUP | Facility: MEDICAL CENTER | Age: 84
End: 2019-04-05
Payer: MEDICARE

## 2019-04-05 VITALS
WEIGHT: 137 LBS | HEART RATE: 60 BPM | DIASTOLIC BLOOD PRESSURE: 62 MMHG | RESPIRATION RATE: 16 BRPM | SYSTOLIC BLOOD PRESSURE: 100 MMHG | BODY MASS INDEX: 22.82 KG/M2 | TEMPERATURE: 97.7 F | HEIGHT: 65 IN | OXYGEN SATURATION: 96 %

## 2019-04-05 DIAGNOSIS — I10 ESSENTIAL HYPERTENSION: Chronic | ICD-10-CM

## 2019-04-05 DIAGNOSIS — E87.6 HYPOKALEMIA: Chronic | ICD-10-CM

## 2019-04-05 DIAGNOSIS — M85.852 OSTEOPENIA OF NECK OF LEFT FEMUR: ICD-10-CM

## 2019-04-05 DIAGNOSIS — E78.5 DYSLIPIDEMIA: Chronic | ICD-10-CM

## 2019-04-05 DIAGNOSIS — R73.01 IFG (IMPAIRED FASTING GLUCOSE): Chronic | ICD-10-CM

## 2019-04-05 PROCEDURE — 99214 OFFICE O/P EST MOD 30 MIN: CPT | Performed by: NURSE PRACTITIONER

## 2019-04-05 PROCEDURE — 8041 PR SCP AHA: Performed by: NURSE PRACTITIONER

## 2019-04-05 RX ORDER — LISINOPRIL 40 MG/1
40 TABLET ORAL DAILY
Qty: 30 TAB | Refills: 6 | Status: SHIPPED | OUTPATIENT
Start: 2019-04-05 | End: 2019-04-16 | Stop reason: SDUPTHER

## 2019-04-05 RX ORDER — HYDROCHLOROTHIAZIDE 25 MG/1
25 TABLET ORAL DAILY
Qty: 30 TAB | Refills: 6 | Status: SHIPPED | OUTPATIENT
Start: 2019-04-05 | End: 2020-01-15 | Stop reason: SDUPTHER

## 2019-04-05 RX ORDER — POTASSIUM CHLORIDE 750 MG/1
20 TABLET, FILM COATED, EXTENDED RELEASE ORAL 3 TIMES DAILY
Qty: 180 TAB | Refills: 6 | Status: SHIPPED | OUTPATIENT
Start: 2019-04-05 | End: 2020-01-15 | Stop reason: SDUPTHER

## 2019-04-05 RX ORDER — AMLODIPINE BESYLATE 5 MG/1
5 TABLET ORAL DAILY
Qty: 30 TAB | Refills: 6 | Status: SHIPPED | OUTPATIENT
Start: 2019-04-05 | End: 2020-01-15 | Stop reason: SDUPTHER

## 2019-04-05 RX ORDER — METOPROLOL TARTRATE 50 MG/1
50 TABLET, FILM COATED ORAL 2 TIMES DAILY
Qty: 60 TAB | Refills: 6 | Status: SHIPPED | OUTPATIENT
Start: 2019-04-05 | End: 2019-11-18 | Stop reason: SDUPTHER

## 2019-04-05 NOTE — PROGRESS NOTES
cc:  Follow up labs/Cholesterol      Subjective:     HPI:     Елена Perla is a 83 y.o. female here to discuss the evaluation and management of:      1.Dyslipidemia   Patient is here today to review her most recent labs.  Patient did have her lipid panel completed with a slight elevation in triglycerides.  Patient is active as she does walk 3-4 miles approximately 4 times a week as well as she dances weekly.  Does not endorse a high fat diet.      2. Essential hypertension  Patient has been taking her metoprolol 50 mg twice a day, her lisinopril 40 mg daily, her hydrochlorthiazide 25 mg daily and her amlodipine 5 mg daily.  Denies any chest pain, shortness of breath, dizziness, heart palpitations or leg swelling.    3. IFG (impaired fasting glucose)  Reviewed most recent labs and current A1c is 5.8%.  She does continue to walk about 3-4 miles about 4 times a week.  Does have a family history of diabetes.      4.Hypokalemia  Patient takes a potassium supplement for this.    5.  Osteopenia of neck of left femur  Patient takes a calcium and vitamin D supplement for this.  She is active with weightbearing exercises.  No history of recent fall.      ROS:  Denies any Headache, Blurred Vision, Confusion, Chest pain,  Shortness of breath,  Abdominal pain, Changes of bowel or bladder, Lower ext edema, Fevers, Nights sweats, Weight Changes, Focal weakness or numbness.  And all other systems are negative.        Current Outpatient Prescriptions:   •  potassium chloride ER (KLOR-CON 10) 10 MEQ tablet, Take 2 Tabs by mouth 3 times a day., Disp: 180 Tab, Rfl: 6  •  metoprolol (LOPRESSOR) 50 MG Tab, Take 1 Tab by mouth 2 times a day., Disp: 60 Tab, Rfl: 6  •  lisinopril (PRINIVIL, ZESTRIL) 40 MG tablet, Take 1 Tab by mouth every day., Disp: 30 Tab, Rfl: 6  •  hydroCHLOROthiazide (HYDRODIURIL) 25 MG Tab, Take 1 Tab by mouth every day., Disp: 30 Tab, Rfl: 6  •  amLODIPine (NORVASC) 5 MG Tab, Take 1 Tab by mouth every day., Disp:  30 Tab, Rfl: 6  •  Potassium 99 MG Tab, Take 1 Tab by mouth. 4 tabs a day, Disp: , Rfl:   •  VITAMIN A PO, Take  by mouth., Disp: , Rfl:   •  Multiple Vitamins-Minerals (OCUVITE) TABS, Take 1 Tab by mouth every day., Disp: , Rfl:   •  therapeutic multivitamin-minerals (THERAGRAN-M) TABS, Take 1 Tab by mouth every day., Disp: , Rfl:   •  Calcium Carb-Cholecalciferol (CALCIUM + D3) 600-200 MG-UNIT TABS, Take  by mouth. 5 daily, Disp: , Rfl:   •  Magnesium 250 MG TABS, Take  by mouth every day., Disp: , Rfl:   •  Cyanocobalamin (VITAMIN B-12) 5000 MCG SUBL, Place  under tongue every day., Disp: , Rfl:   •  Garlic 1000 MG CAPS, Take  by mouth every day., Disp: , Rfl:   •  Cholecalciferol (VITAMIN D3) 5000 UNITS CAPS, Take 1 Cap by mouth every day., Disp: , Rfl:   •  Omega-3 Fatty Acids (FISH OIL) 1200 MG CAPS, Take  by mouth. 5 caps daily, Disp: , Rfl:     Allergies   Allergen Reactions   • Percodan  [Kdc:Yellow Dye+Aspirin+Oxycodone] Vomiting       Past Medical History:   Diagnosis Date   • Back pain    • Hypertension    • Leukopenia 12/14/2018   • Neutropenia (HCC) 12/14/2018   • Unspecified cataract      Past Surgical History:   Procedure Laterality Date   • CATARACT PHACO WITH IOL  9/10/2014    Performed by Thanh Lloyd M.[facility]   • CATARACT PHACO WITH IOL  8/27/2014    Performed by Thanh Lloyd M.D. at SURGERY SAME DAY [facility]   • CHOLECYSTECTOMY  12/4/2008    lap arturo   • HYSTERECTOMY, TOTAL ABDOMINAL       No family history on file.  Social History     Social History   • Marital status: Single     Spouse name: N/A   • Number of children: N/A   • Years of education: N/A     Occupational History   • Not on file.     Social History Main Topics   • Smoking status: Never Smoker   • Smokeless tobacco: Never Used   • Alcohol use No   • Drug use: No   • Sexual activity: Not on file     Other Topics Concern   • Not on file     Social History Narrative   • No narrative on file  "      Objective:     Vitals: /62   Pulse 60   Temp 36.5 °C (97.7 °F)   Resp 16   Ht 1.651 m (5' 5\")   Wt 62.1 kg (137 lb)   SpO2 96%   BMI 22.80 kg/m²    General: Alert, pleasant, NAD  HEENT: Normocephalic.      Heart: Regular rate and rhythm.  S1 and S2 normal.  No murmurs appreciated.  Respiratory: Normal respiratory effort.  Clear to auscultation bilaterally. No wheezing  Skin: Warm, dry, no rashes.  Musculoskeletal: Gait is normal.  Moves all extremities well.  Extremities: No leg edema. No discoloration  Neurological: No tremors, sensation grossly intact, gait is normal,   Psych:  Affect/mood is normal, judgement is good, memory is intact, grooming is appropriate.    Assessment/Plan:     Елена was seen today for results.    Diagnoses and all orders for this visit:    Dyslipidemia  Stable at this time.  Slight increase in her triglycerides.  Have discussed with patient dietary modifications she can make.  Handouts provided on reducing her triglycerides.  Not on a statin.  Continue with moderate physical activity.    Essential hypertension  Stable in clinic today.  Tolerating current regimen without any signs or symptoms of hypotension.  Most recent GFR is above 60.  -     metoprolol (LOPRESSOR) 50 MG Tab; Take 1 Tab by mouth 2 times a day.  -     lisinopril (PRINIVIL, ZESTRIL) 40 MG tablet; Take 1 Tab by mouth every day.  -     hydroCHLOROthiazide (HYDRODIURIL) 25 MG Tab; Take 1 Tab by mouth every day.  -     amLODIPine (NORVASC) 5 MG Tab; Take 1 Tab by mouth every day.    IFG (impaired fasting glucose)  Most recent A1c was 5.8%.  Continue to work on dietary changes as well as keep up with physical activity.    Hypokalemia  Most recent potassium on last labs was within normal limits.  Continue current regimen.  -     potassium chloride ER (KLOR-CON 10) 10 MEQ tablet; Take 2 Tabs by mouth 3 times a day.    Osteopenia of neck of left femur  Patient continues take her calcium and vitamin D supplement " for this as well as weightbearing exercises proximal me 4 days a week.  Last DEXA scan was 2015.    Return in about 6 months (around 10/5/2019).          Miya CASTAÑEDA      Annual Health Assessment Questions:    1.  Are you currently engaging in any exercise or physical activity? Yes    2.  How would you describe your mood or emotional well-being today? good    3.  Have you had any falls in the last year? No    4.  Have you noticed any problems with your balance or had difficulty walking? No    5.  In the last six months have you experienced any leakage of urine? No    6. DPA/Advanced Directive: Patient has Advanced Directive, but it is not on file. Instructed to bring in a copy to scan into their chart.

## 2019-04-16 DIAGNOSIS — I10 ESSENTIAL HYPERTENSION: Chronic | ICD-10-CM

## 2019-04-16 RX ORDER — LISINOPRIL 40 MG/1
40 TABLET ORAL DAILY
Qty: 100 TAB | Refills: 3 | Status: SHIPPED | OUTPATIENT
Start: 2019-04-16 | End: 2020-01-15 | Stop reason: SDUPTHER

## 2019-04-16 NOTE — TELEPHONE ENCOUNTER
Pharmacy would like 100 day supply?    Was the patient seen in the last year in this department? Yes    Does patient have an active prescription for medications requested? No     Received Request Via: Pharmacy

## 2019-06-26 ENCOUNTER — OFFICE VISIT (OUTPATIENT)
Dept: MEDICAL GROUP | Facility: MEDICAL CENTER | Age: 84
End: 2019-06-26
Payer: MEDICARE

## 2019-06-26 VITALS
HEART RATE: 54 BPM | WEIGHT: 131 LBS | OXYGEN SATURATION: 97 % | HEIGHT: 65 IN | SYSTOLIC BLOOD PRESSURE: 130 MMHG | DIASTOLIC BLOOD PRESSURE: 68 MMHG | TEMPERATURE: 98.1 F | RESPIRATION RATE: 16 BRPM | BODY MASS INDEX: 21.83 KG/M2

## 2019-06-26 DIAGNOSIS — Z02.4 ENCOUNTER FOR EXAMINATION FOR DRIVING LICENSE: ICD-10-CM

## 2019-06-26 PROCEDURE — 7105 PR DMV PHYSICAL: Performed by: NURSE PRACTITIONER

## 2019-06-26 ASSESSMENT — PATIENT HEALTH QUESTIONNAIRE - PHQ9: CLINICAL INTERPRETATION OF PHQ2 SCORE: 0

## 2019-06-27 NOTE — PROGRESS NOTES
cc: Need for update on DMV paperwork      Subjective:     HPI:     Елена Perla is a 83 y.o. female here to discuss the evaluation and management of:    1. Encounter for examination for driving license  Patient is here today as she is requesting to have a paperwork to renew her license signed off.  Patient states that her recent eye exam was less than 1 year ago.  Have encouraged patient to follow-up with her eye care provider to obtain the correct information.  Have that checked the patient's eyes with a in clinic eye exam and she is 2040 on her right and 20/40 on her left eye and 20/30 on both eyes.  She has had a history of having cataract surgery.  She states she currently uses readers as needed.  Patient's medical history is not significant for seizures, severe heart failure, syncopal episodes.  At this time it is not reported that she has any progressive eye disease.      ROS:  Denies any Headache, Blurred Vision, Confusion, Chest pain,  Shortness of breath,  Abdominal pain, Changes of bowel or bladder, Lower ext edema, Fevers, Nights sweats, Weight Changes, Focal weakness or numbness.  And all other systems are negative.        Current Outpatient Prescriptions:   •  lisinopril (PRINIVIL, ZESTRIL) 40 MG tablet, Take 1 Tab by mouth every day., Disp: 100 Tab, Rfl: 3  •  potassium chloride ER (KLOR-CON 10) 10 MEQ tablet, Take 2 Tabs by mouth 3 times a day., Disp: 180 Tab, Rfl: 6  •  metoprolol (LOPRESSOR) 50 MG Tab, Take 1 Tab by mouth 2 times a day., Disp: 60 Tab, Rfl: 6  •  hydroCHLOROthiazide (HYDRODIURIL) 25 MG Tab, Take 1 Tab by mouth every day., Disp: 30 Tab, Rfl: 6  •  amLODIPine (NORVASC) 5 MG Tab, Take 1 Tab by mouth every day., Disp: 30 Tab, Rfl: 6  •  Potassium 99 MG Tab, Take 1 Tab by mouth. 4 tabs a day, Disp: , Rfl:   •  VITAMIN A PO, Take  by mouth., Disp: , Rfl:   •  Multiple Vitamins-Minerals (OCUVITE) TABS, Take 1 Tab by mouth every day., Disp: , Rfl:   •  therapeutic multivitamin-minerals  "(THERAGRAN-M) TABS, Take 1 Tab by mouth every day., Disp: , Rfl:   •  Calcium Carb-Cholecalciferol (CALCIUM + D3) 600-200 MG-UNIT TABS, Take  by mouth. 5 daily, Disp: , Rfl:   •  Magnesium 250 MG TABS, Take  by mouth every day., Disp: , Rfl:   •  Cyanocobalamin (VITAMIN B-12) 5000 MCG SUBL, Place  under tongue every day., Disp: , Rfl:   •  Garlic 1000 MG CAPS, Take  by mouth every day., Disp: , Rfl:   •  Cholecalciferol (VITAMIN D3) 5000 UNITS CAPS, Take 1 Cap by mouth every day., Disp: , Rfl:   •  Omega-3 Fatty Acids (FISH OIL) 1200 MG CAPS, Take  by mouth. 5 caps daily, Disp: , Rfl:     Allergies   Allergen Reactions   • Percodan  [Kdc:Yellow Dye+Aspirin+Oxycodone] Vomiting       Past Medical History:   Diagnosis Date   • Back pain    • Hypertension    • Leukopenia 12/14/2018   • Neutropenia (HCC) 12/14/2018   • Unspecified cataract     bilateral removal      Past Surgical History:   Procedure Laterality Date   • CATARACT PHACO WITH IOL  9/10/2014    Performed by Thanh Lloyd M.D. at [facility]   • CATARACT PHACO WITH IOL  8/27/2014    Performed by Thanh Lloyd M.D. at [facility]   • CHOLECYSTECTOMY  12/4/2008    lap arturo   • HYSTERECTOMY, TOTAL ABDOMINAL       No family history on file.  Social History     Social History   • Marital status: Single     Spouse name: N/A   • Number of children: N/A   • Years of education: N/A     Occupational History   • Not on file.     Social History Main Topics   • Smoking status: Never Smoker   • Smokeless tobacco: Never Used   • Alcohol use No   • Drug use: No   • Sexual activity: Not on file     Other Topics Concern   • Not on file     Social History Narrative   • No narrative on file       Objective:     Vitals: /68   Pulse (!) 54   Temp 36.7 °C (98.1 °F)   Resp 16   Ht 1.651 m (5' 5\")   Wt 59.4 kg (131 lb)   SpO2 97%   BMI 21.80 kg/m²    General: Alert, pleasant, NAD  HEENT: Normocephalic.   Skin: Warm, dry, no " rashes.  Extremities: No leg edema. No discoloration  Neurological: No tremors  Psych:  Affect/mood is normal, judgement is good, memory is intact, grooming is appropriate.    Assessment/Plan:     Diagnoses and all orders for this visit:    Encounter for examination for driving license  Patient needs her DMV form signed so she can continue to have her 's license.  Patient does not have a medical history significant for severe heart failure, seizures, brain tumors or syncopal episodes.  Have discussed with the patient that we did a vision screening clinic today and it does show 20/40 in the right eye 20/40 in the left eye and 20/30 both eyes.  Have discussed the patient that I feel comfortable with her also getting the correct signature from her optometrist on the form especially since she states that she has been seen within the year.  I do not have copies of these records and feel that is in her best interest to have this signed off by her optometrist.  Have signed off on the medical portion.      No Follow-up on file.          Miya MCPHERSON.

## 2019-07-15 RX ORDER — AMOXICILLIN 500 MG/1
500 CAPSULE ORAL 2 TIMES DAILY
Qty: 20 CAP | Refills: 0 | Status: SHIPPED | OUTPATIENT
Start: 2019-07-15 | End: 2020-01-15

## 2019-11-18 DIAGNOSIS — I10 ESSENTIAL HYPERTENSION: Chronic | ICD-10-CM

## 2019-11-18 RX ORDER — METOPROLOL TARTRATE 50 MG/1
50 TABLET, FILM COATED ORAL 2 TIMES DAILY
Qty: 200 TAB | Refills: 1 | Status: SHIPPED | OUTPATIENT
Start: 2019-11-18 | End: 2020-01-15 | Stop reason: SDUPTHER

## 2019-11-21 ENCOUNTER — HOSPITAL ENCOUNTER (OUTPATIENT)
Dept: LAB | Facility: MEDICAL CENTER | Age: 84
End: 2019-11-21
Attending: NURSE PRACTITIONER
Payer: MEDICARE

## 2019-11-21 DIAGNOSIS — R73.01 IFG (IMPAIRED FASTING GLUCOSE): Chronic | ICD-10-CM

## 2019-11-21 DIAGNOSIS — I10 ESSENTIAL HYPERTENSION: Chronic | ICD-10-CM

## 2019-11-21 DIAGNOSIS — E78.5 DYSLIPIDEMIA: Chronic | ICD-10-CM

## 2019-11-21 LAB
ALBUMIN SERPL BCP-MCNC: 4.3 G/DL (ref 3.2–4.9)
ALBUMIN/GLOB SERPL: 1.5 G/DL
ALP SERPL-CCNC: 78 U/L (ref 30–99)
ALT SERPL-CCNC: 15 U/L (ref 2–50)
ANION GAP SERPL CALC-SCNC: 8 MMOL/L (ref 0–11.9)
AST SERPL-CCNC: 17 U/L (ref 12–45)
BILIRUB SERPL-MCNC: 0.8 MG/DL (ref 0.1–1.5)
BUN SERPL-MCNC: 20 MG/DL (ref 8–22)
CALCIUM SERPL-MCNC: 9.2 MG/DL (ref 8.5–10.5)
CHLORIDE SERPL-SCNC: 102 MMOL/L (ref 96–112)
CHOLEST SERPL-MCNC: 174 MG/DL (ref 100–199)
CO2 SERPL-SCNC: 30 MMOL/L (ref 20–33)
CREAT SERPL-MCNC: 0.88 MG/DL (ref 0.5–1.4)
EST. AVERAGE GLUCOSE BLD GHB EST-MCNC: 120 MG/DL
GLOBULIN SER CALC-MCNC: 2.9 G/DL (ref 1.9–3.5)
GLUCOSE SERPL-MCNC: 88 MG/DL (ref 65–99)
HBA1C MFR BLD: 5.8 % (ref 0–5.6)
HDLC SERPL-MCNC: 57 MG/DL
LDLC SERPL CALC-MCNC: 86 MG/DL
POTASSIUM SERPL-SCNC: 3.9 MMOL/L (ref 3.6–5.5)
PROT SERPL-MCNC: 7.2 G/DL (ref 6–8.2)
SODIUM SERPL-SCNC: 140 MMOL/L (ref 135–145)
TRIGL SERPL-MCNC: 156 MG/DL (ref 0–149)
TSH SERPL DL<=0.005 MIU/L-ACNC: 2.31 UIU/ML (ref 0.38–5.33)

## 2019-11-21 PROCEDURE — 80061 LIPID PANEL: CPT

## 2019-11-21 PROCEDURE — 82043 UR ALBUMIN QUANTITATIVE: CPT

## 2019-11-21 PROCEDURE — 82570 ASSAY OF URINE CREATININE: CPT

## 2019-11-21 PROCEDURE — 36415 COLL VENOUS BLD VENIPUNCTURE: CPT

## 2019-11-21 PROCEDURE — 84443 ASSAY THYROID STIM HORMONE: CPT

## 2019-11-21 PROCEDURE — 80053 COMPREHEN METABOLIC PANEL: CPT

## 2019-11-21 PROCEDURE — 83036 HEMOGLOBIN GLYCOSYLATED A1C: CPT

## 2019-11-22 LAB
CREAT UR-MCNC: 86.8 MG/DL
MICROALBUMIN UR-MCNC: 0.9 MG/DL
MICROALBUMIN/CREAT UR: 10 MG/G (ref 0–30)

## 2020-01-06 ENCOUNTER — PATIENT OUTREACH (OUTPATIENT)
Dept: HEALTH INFORMATION MANAGEMENT | Facility: OTHER | Age: 85
End: 2020-01-06

## 2020-01-06 NOTE — PROGRESS NOTES
1. Attempt #: Final    2. HealthConnect Verified: yes    3. Verify PCP: yes    4. Review Care Team: yes    5. Reviewed/Updated the following with patient:       •   Communication Preference Obtained? YES       •   Preferred Pharmacy? YES       •   Preferred Lab? YES       •   Family History (document living status of immediate family members and if + hx of cancer, diabetes, hypertension, hyperlipidemia, heart attack, stroke) NOT ABLE TO ADDRESS.    7. Annual Wellness Visit Scheduling  · Scheduling Status:Scheduled     8. Care Gap Scheduling (Attempt to Schedule EACH Overdue Care Gap!)/ Not able to address care gaps.     Health Maintenance Due   Topic Date Due   • Annual Wellness Visit  1935   • COLONOSCOPY  07/20/1985   • MAMMOGRAM  04/17/2019         9. [employer] Activation: declined    10. [employer] Nadine: no    11. Virtual Visits: no    12. Opt In to Text Messages: no    13. Patient was advised: “This is a free wellness visit. The provider will screen for medical conditions to help you stay healthy. If you have other concerns to address you may be asked to discuss these at a separate visit or there may be an additional fee.”     14. Patient was informed to arrive 15 min prior to their scheduled appointment and bring in their medication bottles.

## 2020-01-15 ENCOUNTER — OFFICE VISIT (OUTPATIENT)
Dept: MEDICAL GROUP | Facility: MEDICAL CENTER | Age: 85
End: 2020-01-15
Payer: MEDICARE

## 2020-01-15 VITALS
OXYGEN SATURATION: 99 % | SYSTOLIC BLOOD PRESSURE: 118 MMHG | HEART RATE: 61 BPM | DIASTOLIC BLOOD PRESSURE: 64 MMHG | TEMPERATURE: 97.2 F | WEIGHT: 134.4 LBS | HEIGHT: 65 IN | BODY MASS INDEX: 22.39 KG/M2

## 2020-01-15 DIAGNOSIS — R73.03 PRE-DIABETES: Chronic | ICD-10-CM

## 2020-01-15 DIAGNOSIS — Z00.00 MEDICARE ANNUAL WELLNESS VISIT, SUBSEQUENT: ICD-10-CM

## 2020-01-15 DIAGNOSIS — E87.6 HYPOKALEMIA: Chronic | ICD-10-CM

## 2020-01-15 DIAGNOSIS — I10 ESSENTIAL HYPERTENSION: Chronic | ICD-10-CM

## 2020-01-15 DIAGNOSIS — M54.41 CHRONIC BILATERAL LOW BACK PAIN WITH BILATERAL SCIATICA: ICD-10-CM

## 2020-01-15 DIAGNOSIS — M54.42 CHRONIC BILATERAL LOW BACK PAIN WITH BILATERAL SCIATICA: ICD-10-CM

## 2020-01-15 DIAGNOSIS — G89.29 CHRONIC BILATERAL LOW BACK PAIN WITH BILATERAL SCIATICA: ICD-10-CM

## 2020-01-15 DIAGNOSIS — M85.852 OSTEOPENIA OF NECK OF LEFT FEMUR: ICD-10-CM

## 2020-01-15 DIAGNOSIS — E78.5 DYSLIPIDEMIA: Chronic | ICD-10-CM

## 2020-01-15 DIAGNOSIS — Z12.31 ENCOUNTER FOR SCREENING MAMMOGRAM FOR BREAST CANCER: ICD-10-CM

## 2020-01-15 PROBLEM — N28.9 ABNORMAL RENAL FUNCTION: Status: RESOLVED | Noted: 2018-12-14 | Resolved: 2020-01-15

## 2020-01-15 PROCEDURE — 8041 PR SCP AHA: Performed by: NURSE PRACTITIONER

## 2020-01-15 PROCEDURE — G0439 PPPS, SUBSEQ VISIT: HCPCS | Performed by: NURSE PRACTITIONER

## 2020-01-15 RX ORDER — AMLODIPINE BESYLATE 5 MG/1
5 TABLET ORAL DAILY
Qty: 30 TAB | Refills: 11 | Status: SHIPPED | OUTPATIENT
Start: 2020-01-15 | End: 2021-02-16 | Stop reason: SDUPTHER

## 2020-01-15 RX ORDER — HYDROCHLOROTHIAZIDE 25 MG/1
25 TABLET ORAL DAILY
Qty: 30 TAB | Refills: 11 | Status: SHIPPED | OUTPATIENT
Start: 2020-01-15 | End: 2021-02-16 | Stop reason: SDUPTHER

## 2020-01-15 RX ORDER — LISINOPRIL 40 MG/1
40 TABLET ORAL DAILY
Qty: 30 TAB | Refills: 11 | Status: SHIPPED | OUTPATIENT
Start: 2020-01-15 | End: 2020-11-18 | Stop reason: SDUPTHER

## 2020-01-15 RX ORDER — METOPROLOL TARTRATE 50 MG/1
50 TABLET, FILM COATED ORAL 2 TIMES DAILY
Qty: 60 TAB | Refills: 11 | Status: SHIPPED | OUTPATIENT
Start: 2020-01-15 | End: 2021-02-16 | Stop reason: SDUPTHER

## 2020-01-15 RX ORDER — POTASSIUM CHLORIDE 750 MG/1
20 TABLET, FILM COATED, EXTENDED RELEASE ORAL 3 TIMES DAILY
Qty: 180 TAB | Refills: 11 | Status: SHIPPED | OUTPATIENT
Start: 2020-01-15 | End: 2021-02-16 | Stop reason: SDUPTHER

## 2020-01-15 ASSESSMENT — PATIENT HEALTH QUESTIONNAIRE - PHQ9
SUM OF ALL RESPONSES TO PHQ QUESTIONS 1-9: 4
5. POOR APPETITE OR OVEREATING: 0 - NOT AT ALL
CLINICAL INTERPRETATION OF PHQ2 SCORE: 2

## 2020-01-15 ASSESSMENT — ENCOUNTER SYMPTOMS: GENERAL WELL-BEING: EXCELLENT

## 2020-01-15 ASSESSMENT — ACTIVITIES OF DAILY LIVING (ADL): BATHING_REQUIRES_ASSISTANCE: 0

## 2020-01-15 NOTE — PROGRESS NOTES
Chief Complaint   Patient presents with   • Annual Wellness Visit         HPI:  Елена is a 84 y.o. here for Medicare Annual Wellness Visit    ]  Patient Active Problem List    Diagnosis Date Noted   • Chronic bilateral low back pain with bilateral sciatica 01/15/2020   • Osteopenia of neck of left femur 04/05/2019   • Hypokalemia 12/14/2018   • Dyslipidemia 12/14/2018   • Osteochondropathy 12/14/2018   • Essential hypertension 12/14/2018   • Pre-diabetes 12/14/2018   • Senile nuclear sclerosis 08/27/2014       Current Outpatient Medications   Medication Sig Dispense Refill   • amLODIPine (NORVASC) 5 MG Tab Take 1 Tab by mouth every day. 30 Tab 11   • hydroCHLOROthiazide (HYDRODIURIL) 25 MG Tab Take 1 Tab by mouth every day. 30 Tab 11   • lisinopril (PRINIVIL) 40 MG tablet Take 1 Tab by mouth every day. 30 Tab 11   • metoprolol (LOPRESSOR) 50 MG Tab Take 1 Tab by mouth 2 times a day. 60 Tab 11   • potassium chloride ER (KLOR-CON 10) 10 MEQ tablet Take 2 Tabs by mouth 3 times a day. 180 Tab 11   • Potassium 99 MG Tab Take 1 Tab by mouth. 4 tabs a day     • VITAMIN A PO Take  by mouth.     • Multiple Vitamins-Minerals (OCUVITE) TABS Take 1 Tab by mouth every day.     • therapeutic multivitamin-minerals (THERAGRAN-M) TABS Take 1 Tab by mouth every day.     • Calcium Carb-Cholecalciferol (CALCIUM + D3) 600-200 MG-UNIT TABS Take  by mouth. 5 daily     • Magnesium 250 MG TABS Take  by mouth every day.     • Cyanocobalamin (VITAMIN B-12) 5000 MCG SUBL Place  under tongue every day.     • Garlic 1000 MG CAPS Take  by mouth every day.     • Cholecalciferol (VITAMIN D3) 5000 UNITS CAPS Take 1 Cap by mouth every day.     • Omega-3 Fatty Acids (FISH OIL) 1200 MG CAPS Take  by mouth. 5 caps daily       No current facility-administered medications for this visit.         Patient is taking medications as noted in medication list.  Current supplements as per medication list.     Allergies: Percodan  [kdc:yellow dye+aspirin+oxycodone]  and Percodan-sepideh  [oxycodone-aspirin]    Current social contact/activities: PT states he lives with her daughter and visits with family and friends.     Is patient current with immunizations? Yes.    She  reports that she has never smoked. She has never used smokeless tobacco. She reports that she does not drink alcohol or use drugs.  Counseling given: Not Answered        DPA/Advanced directive: Patient has Living Will on file.     ROS:    Gait: Uses no assistive device   Ostomy: No   Other tubes: No   Amputations: no  Chronic oxygen use No   Last eye exam - within the last 3 months  Wears hearing aids: No   : Denies any urinary leakage during the last 6 months          Depression Screening    Little interest or pleasure in doing things?  1 - several days  Feeling down, depressed, or hopeless? 1 - several days  Patient Health Questionnaire Score: 2    If depressive symptoms identified deferred to follow up visit unless specifically addressed in assessment and plan.    Interpretation of PHQ-9 Total Score   Score Severity   1-4 No Depression   5-9 Mild Depression   10-14 Moderate Depression   15-19 Moderately Severe Depression   20-27 Severe Depression    Screening for Cognitive Impairment    Three Minute Recall (village, kitchen, baby)  3/3    Sushant clock face with all 12 numbers and set the hands to show 10 past 10.  Yes 5/5  If cognitive concerns identified, deferred for follow up unless specifically addressed in assessment and plan.    Fall Risk Assessment    Has the patient had two or more falls in the last year or any fall with injury in the last year?  No  If fall risk identified, deferred for follow up unless specifically addressed in assessment and plan.    Safety Assessment    Throw rugs on floor.  No  Handrails on all stairs.  Yes  Good lighting in all hallways.  Yes  Difficulty hearing.  No  Patient counseled about all safety risks that were identified.    Functional Assessment ADLs    Are there any  barriers preventing you from cooking for yourself or meeting nutritional needs?  No.    Are there any barriers preventing you from driving safely or obtaining transportation?  No.    Are there any barriers preventing you from using a telephone or calling for help?  No.    Are there any barriers preventing you from shopping?  No.    Are there any barriers preventing you from taking care of your own finances?  No.    Are there any barriers preventing you from managing your medications?  No.    Are there any barriers preventing you from showering, bathing or dressing yourself?  No.    Are you currently engaging in any exercise or physical activity?  Yes.  Walks the mall 3 x per week  What is your perception of your health?  Excellent.    Health Maintenance Summary                Annual Wellness Visit Overdue 1935     MAMMOGRAM Overdue 4/17/2019      Done 4/17/2018 MA-MAMMO SCREENING BILAT W/TOMOSYNTHESIS W/CAD     Patient has more history with this topic...    IMM ZOSTER VACCINES Postponed 6/26/2020 Originally 7/20/1985. System: vaccine not available, other system reasons    BONE DENSITY Next Due 9/29/2020      Done 9/29/2015 DS-BONE DENSITY STUDY (DEXA)     Patient has more history with this topic...    IMM DTaP/Tdap/Td Vaccine Next Due 4/20/2028      Done 4/20/2018 Imm Admin: Tdap Vaccine          Patient Care Team:  MADDY Knutson as PCP - General (Nurse Practitioner)  Dr.Forrest Robert Roblero as      Social History     Tobacco Use   • Smoking status: Never Smoker   • Smokeless tobacco: Never Used   Substance Use Topics   • Alcohol use: No   • Drug use: No     No family history on file.  She  has a past medical history of Back pain, Hypertension, Leukopenia (12/14/2018), Neutropenia (HCC) (12/14/2018), and Unspecified cataract.   Past Surgical History:   Procedure Laterality Date   • CATARACT PHACO WITH IOL  9/10/2014    Performed by Thanh Lloyd M.D. at  "[facility]   • CATARACT PHACO WITH IOL  8/27/2014    Performed by Thanh Lloyd M.D. at [facility]   • CHOLECYSTECTOMY  12/4/2008    lap arturo   • HYSTERECTOMY, TOTAL ABDOMINAL             Exam:     /64 (BP Location: Right arm, Patient Position: Sitting, BP Cuff Size: Adult)   Pulse 61   Temp 36.2 °C (97.2 °F) (Temporal)   Ht 1.651 m (5' 5\")   Wt 61 kg (134 lb 6.4 oz)   SpO2 99%  Body mass index is 22.37 kg/m².    Hearing good.    Dentition-uppers and lowers  Alert, oriented in no acute distress.  Eye contact is good, speech goal directed, affect calm      Assessment and Plan. The following treatment and monitoring plan is recommended:    1. Medicare annual wellness visit, subsequent   Have reviewed recommended screenings and immunizations.  Declines colorectal cancer screening. Initial Annual Wellness Visit - Includes PPPS ()   2. Essential hypertension   Chronic.  Controlled on current regimen.  Continue current regimen at this time.  Denies any chest pain, shortness of breath or dizziness. Initial Annual Wellness Visit - Includes PPPS ()    amLODIPine (NORVASC) 5 MG Tab    hydroCHLOROthiazide (HYDRODIURIL) 25 MG Tab    lisinopril (PRINIVIL) 40 MG tablet    metoprolol (LOPRESSOR) 50 MG Tab   3. Dyslipidemia   Stable.  Not on any medications at this time.  Continue lifestyle modifications, routine monitoring. Initial Annual Wellness Visit - Includes PPPS ()   4. Pre-diabetes   Stable.  Routine monitoring.  Last A1c 5.8%. Initial Annual Wellness Visit - Includes PPPS ()   5. Osteopenia of neck of left femur   Stable.  Continue taking vitamin D, calcium, weightbearing exercises and avoid tobacco and alcohol use. Initial Annual Wellness Visit - Includes PPPS ()   6. Chronic bilateral low back pain with bilateral sciatica   Chronic. Followed by pain management for this. Has treatment epidurals    7. Hypokalemia   Stable. Continue taking " supplements. potassium chloride ER (KLOR-CON 10) 10 MEQ tablet   8. Encounter for screening mammogram for breast cancer  Initial Annual Wellness Visit - Includes PPPS ()    MA-SCREENING MAMMO BILAT W/TOMOSYNTHESIS W/CAD         Services suggested: No services needed at this time  Health Care Screening recommendations as per orders if indicated.  Referrals offered: PT/OT/Nutrition counseling/Behavioral Health/Smoking cessation as per orders if indicated.    Discussion today about general wellness and lifestyle habits:    · Prevent falls and reduce trip hazards; Cautioned about securing or removing rugs.  · Have a working fire alarm and carbon monoxide detector;   · Engage in regular physical activity and social activities       Follow-up: No follow-ups on file.

## 2020-06-26 ENCOUNTER — OFFICE VISIT (OUTPATIENT)
Dept: MEDICAL GROUP | Facility: MEDICAL CENTER | Age: 85
End: 2020-06-26
Payer: MEDICARE

## 2020-06-26 VITALS
TEMPERATURE: 97.7 F | SYSTOLIC BLOOD PRESSURE: 136 MMHG | DIASTOLIC BLOOD PRESSURE: 78 MMHG | BODY MASS INDEX: 22.66 KG/M2 | HEIGHT: 65 IN | OXYGEN SATURATION: 95 % | WEIGHT: 136 LBS | HEART RATE: 69 BPM

## 2020-06-26 DIAGNOSIS — F43.22 ADJUSTMENT DISORDER WITH ANXIOUS MOOD: ICD-10-CM

## 2020-06-26 DIAGNOSIS — G25.0 ESSENTIAL TREMOR: ICD-10-CM

## 2020-06-26 PROCEDURE — 99214 OFFICE O/P EST MOD 30 MIN: CPT | Performed by: NURSE PRACTITIONER

## 2020-06-26 RX ORDER — PROPRANOLOL HYDROCHLORIDE 10 MG/1
10 TABLET ORAL 2 TIMES DAILY
Qty: 60 TAB | Refills: 3 | Status: SHIPPED | OUTPATIENT
Start: 2020-06-26 | End: 2020-07-23

## 2020-06-26 ASSESSMENT — FIBROSIS 4 INDEX: FIB4 SCORE: 2.18

## 2020-06-26 NOTE — PROGRESS NOTES
cc:  anxiety      Subjective:     HPI:     Елена Perla is a 84 y.o. female here to discuss the evaluation and management of:    Anxiety  Presents today with complaints of having anxiety. States has had in the past but has been heightened recently due to pandemic as well as the current events in the world.  Would like something for her calm her down. She is very nerved up. When she is really upset she might feel some heart fluttering then when she calms down it resolves.  She has been trying to get some exercise-dancing in her house. Has not tried anything in the past for this.     Hand tremors  Reports this has been ongoing for years. Sometimes can be bothersome to her when she is trying to eat or put on makeup. Worse when she is nerved up. Has not tried anything for this.     ROS:  Denies any Headache, Blurred Vision, Confusion, Chest pain,  Shortness of breath,  Abdominal pain, Changes of bowel or bladder, Lower ext edema, Fevers, Nights sweats, Weight Changes, Focal weakness or numbness.  And all other systems reviewed and are all negative.anxiety, tremors        Current Outpatient Medications:   •  propranolol (INDERAL) 10 MG Tab, Take 1 Tab by mouth 2 times a day., Disp: 60 Tab, Rfl: 3  •  amLODIPine (NORVASC) 5 MG Tab, Take 1 Tab by mouth every day., Disp: 30 Tab, Rfl: 11  •  hydroCHLOROthiazide (HYDRODIURIL) 25 MG Tab, Take 1 Tab by mouth every day., Disp: 30 Tab, Rfl: 11  •  lisinopril (PRINIVIL) 40 MG tablet, Take 1 Tab by mouth every day., Disp: 30 Tab, Rfl: 11  •  metoprolol (LOPRESSOR) 50 MG Tab, Take 1 Tab by mouth 2 times a day., Disp: 60 Tab, Rfl: 11  •  potassium chloride ER (KLOR-CON 10) 10 MEQ tablet, Take 2 Tabs by mouth 3 times a day., Disp: 180 Tab, Rfl: 11  •  Potassium 99 MG Tab, Take 1 Tab by mouth. 4 tabs a day, Disp: , Rfl:   •  VITAMIN A PO, Take  by mouth., Disp: , Rfl:   •  Multiple Vitamins-Minerals (OCUVITE) TABS, Take 1 Tab by mouth every day., Disp: , Rfl:   •  therapeutic  multivitamin-minerals (THERAGRAN-M) TABS, Take 1 Tab by mouth every day., Disp: , Rfl:   •  Calcium Carb-Cholecalciferol (CALCIUM + D3) 600-200 MG-UNIT TABS, Take  by mouth. 5 daily, Disp: , Rfl:   •  Magnesium 250 MG TABS, Take  by mouth every day., Disp: , Rfl:   •  Cyanocobalamin (VITAMIN B-12) 5000 MCG SUBL, Place  under tongue every day., Disp: , Rfl:   •  Garlic 1000 MG CAPS, Take  by mouth every day., Disp: , Rfl:   •  Cholecalciferol (VITAMIN D3) 5000 UNITS CAPS, Take 1 Cap by mouth every day., Disp: , Rfl:   •  Omega-3 Fatty Acids (FISH OIL) 1200 MG CAPS, Take  by mouth. 5 caps daily, Disp: , Rfl:     Allergies   Allergen Reactions   • Percodan  [Kdc:Yellow Dye+Aspirin+Oxycodone] Vomiting   • Percodan-Varsha  [Oxycodone-Aspirin] Vomiting     Other reaction(s): Dizziness       Past Medical History:   Diagnosis Date   • Back pain    • Hypertension    • Leukopenia 12/14/2018   • Neutropenia (HCC) 12/14/2018   • Unspecified cataract     bilateral removal      Past Surgical History:   Procedure Laterality Date   • CATARACT PHACO WITH IOL  9/10/2014    Performed by Thanh Lloyd M.D. at [facility]   • CATARACT PHACO WITH IOL  8/27/2014    Performed by Thanh Lloyd M[facility]D. [facility]   • CHOLECYSTECTOMY  12/4/2008    lap arturo   • HYSTERECTOMY, TOTAL ABDOMINAL       History reviewed. No pertinent family history.  Social History     Socioeconomic History   • Marital status: Single     Spouse name: Not on file   • Number of children: Not on file   • Years of education: Not on file   • Highest education level: Not on file   Occupational History   • Not on file   Social Needs   • Financial resource strain: Not on file   • Food insecurity     Worry: Not on file     Inability: Not on file   • Transportation needs     Medical: Not on file     Non-medical: Not on file   Tobacco Use   • Smoking status: Never Smoker   • Smokeless tobacco: Never Used   Substance and Sexual  "Activity   • Alcohol use: No   • Drug use: No   • Sexual activity: Not on file   Lifestyle   • Physical activity     Days per week: Not on file     Minutes per session: Not on file   • Stress: Not on file   Relationships   • Social connections     Talks on phone: Not on file     Gets together: Not on file     Attends Religion service: Not on file     Active member of club or organization: Not on file     Attends meetings of clubs or organizations: Not on file     Relationship status: Not on file   • Intimate partner violence     Fear of current or ex partner: Not on file     Emotionally abused: Not on file     Physically abused: Not on file     Forced sexual activity: Not on file   Other Topics Concern   • Not on file   Social History Narrative   • Not on file       Objective:     Vitals: /78 (BP Location: Left arm, Patient Position: Sitting)   Pulse 69   Temp 36.5 °C (97.7 °F)   Ht 1.651 m (5' 5\")   Wt 61.7 kg (136 lb)   SpO2 95%   BMI 22.63 kg/m²    General: Alert, pleasant, NAD  HEENT: Normocephalic.    Skin: Warm, dry, no rashes.  Extremities: No leg edema. No discoloration  Neurological: very slight hand tremor  Psych:  Affect/mood is anxiety, judgement is good, memory is intact, grooming is appropriate.    Assessment/Plan:     Елена was seen today for anxiety.    Diagnoses and all orders for this visit:    Adjustment disorder with anxious mood  New problem to me. Has been heightened due to current situation regarding pandemic and political situations.wanting medication for this.  Having a hard time with adjusting to new normal. Have reviewed multiple medications. Concern with drowsiness from SSRI's. Concern for anticholinergic effects from hydroxyzine. May try sertraline if propranolol not helpful. Follow up 4-6 weeks.     Essential tremor  New probletm to me. Very slight tremor observed. Trial of propranolol for this as well as her anxiety. HR appropriate at this time. She is on Metoprolol. Have " discussed side effects. Will have her follow up in 4-6 weeks.     -     propranolol (INDERAL) 10 MG Tab; Take 1 Tab by mouth 2 times a day.      Return in about 4 weeks (around 7/24/2020) for follow up meds/tremors/anxiety.          Miya CASTAÑEDA

## 2020-07-14 ENCOUNTER — HOSPITAL ENCOUNTER (OUTPATIENT)
Dept: RADIOLOGY | Facility: MEDICAL CENTER | Age: 85
End: 2020-07-14
Attending: NURSE PRACTITIONER
Payer: MEDICARE

## 2020-07-14 DIAGNOSIS — I10 ESSENTIAL HYPERTENSION: Chronic | ICD-10-CM

## 2020-07-14 DIAGNOSIS — Z12.31 ENCOUNTER FOR SCREENING MAMMOGRAM FOR BREAST CANCER: ICD-10-CM

## 2020-07-14 PROCEDURE — 77067 SCR MAMMO BI INCL CAD: CPT

## 2020-07-23 ENCOUNTER — OFFICE VISIT (OUTPATIENT)
Dept: MEDICAL GROUP | Facility: MEDICAL CENTER | Age: 85
End: 2020-07-23
Payer: MEDICARE

## 2020-07-23 VITALS
SYSTOLIC BLOOD PRESSURE: 116 MMHG | BODY MASS INDEX: 22.46 KG/M2 | DIASTOLIC BLOOD PRESSURE: 69 MMHG | HEIGHT: 65 IN | HEART RATE: 75 BPM | WEIGHT: 134.8 LBS

## 2020-07-23 DIAGNOSIS — G25.0 ESSENTIAL TREMOR: ICD-10-CM

## 2020-07-23 DIAGNOSIS — F43.22 ADJUSTMENT DISORDER WITH ANXIOUS MOOD: ICD-10-CM

## 2020-07-23 PROCEDURE — 99213 OFFICE O/P EST LOW 20 MIN: CPT | Performed by: NURSE PRACTITIONER

## 2020-07-23 RX ORDER — PROPRANOLOL HYDROCHLORIDE 20 MG/1
20 TABLET ORAL DAILY
Qty: 30 TAB | Refills: 5 | Status: SHIPPED | OUTPATIENT
Start: 2020-07-23 | End: 2021-01-29 | Stop reason: SDUPTHER

## 2020-07-23 NOTE — PROGRESS NOTES
cc:  Anxiety/tremor      Subjective:     HPI:     Елена Perla is a 85 y.o. female here to discuss the evaluation and management of:    Anxiety  Presents today to follow-up on her anxiety.  States there is been some improvement however she is not hopeful about it will resolve 100%.  She continues to exercise by dancing in her house, staying active by doing yard work and home improvement.  Denies have any side effects from the propranolol that was recently given for her to trial at her last visit.        Hand tremors  Has been ongoing for years.  Usually just with fine motor movement.  No resting tremor.  Bothersome to her patient when trying to put on make-up and it is worse when her her anxiety is high.  Has noticed slight improvement with the propranolol.         ROS:  Denies any Headache, Blurred Vision, Confusion, Chest pain,  Shortness of breath,  Abdominal pain, Changes of bowel or bladder, Lower ext edema, Fevers, Nights sweats, Weight Changes, Focal weakness or numbness.  And all other systems reviewed and are all negative.  ID, hand tremors        Current Outpatient Medications:   •  propranolol (INDERAL) 20 MG Tab, Take 1 Tab by mouth every day., Disp: 30 Tab, Rfl: 5  •  amLODIPine (NORVASC) 5 MG Tab, Take 1 Tab by mouth every day., Disp: 30 Tab, Rfl: 11  •  hydroCHLOROthiazide (HYDRODIURIL) 25 MG Tab, Take 1 Tab by mouth every day., Disp: 30 Tab, Rfl: 11  •  lisinopril (PRINIVIL) 40 MG tablet, Take 1 Tab by mouth every day., Disp: 30 Tab, Rfl: 11  •  metoprolol (LOPRESSOR) 50 MG Tab, Take 1 Tab by mouth 2 times a day., Disp: 60 Tab, Rfl: 11  •  potassium chloride ER (KLOR-CON 10) 10 MEQ tablet, Take 2 Tabs by mouth 3 times a day., Disp: 180 Tab, Rfl: 11  •  Potassium 99 MG Tab, Take 1 Tab by mouth. 4 tabs a day, Disp: , Rfl:   •  VITAMIN A PO, Take  by mouth., Disp: , Rfl:   •  therapeutic multivitamin-minerals (THERAGRAN-M) TABS, Take 1 Tab by mouth every day., Disp: , Rfl:   •  Calcium  Carb-Cholecalciferol (CALCIUM + D3) 600-200 MG-UNIT TABS, Take  by mouth. 5 daily, Disp: , Rfl:   •  Magnesium 250 MG TABS, Take  by mouth every day., Disp: , Rfl:   •  Cyanocobalamin (VITAMIN B-12) 5000 MCG SUBL, Place  under tongue every day., Disp: , Rfl:   •  Garlic 1000 MG CAPS, Take  by mouth every day., Disp: , Rfl:   •  Cholecalciferol (VITAMIN D3) 5000 UNITS CAPS, Take 1 Cap by mouth every day., Disp: , Rfl:   •  Omega-3 Fatty Acids (FISH OIL) 1200 MG CAPS, Take  by mouth. 5 caps daily, Disp: , Rfl:   •  Multiple Vitamins-Minerals (OCUVITE) TABS, Take 1 Tab by mouth every day., Disp: , Rfl:     Allergies   Allergen Reactions   • Percodan  [Kdc:Yellow Dye+Aspirin+Oxycodone] Vomiting   • Percodan-Varsha  [Oxycodone-Aspirin] Vomiting     Other reaction(s): Dizziness       Past Medical History:   Diagnosis Date   • Back pain    • Hypertension    • Leukopenia 12/14/2018   • Neutropenia (HCC) 12/14/2018   • Unspecified cataract     bilateral removal      Past Surgical History:   Procedure Laterality Date   • CATARACT PHACO WITH IOL  9/10/2014    Performed by Thanh Lloyd M.D. at [facility]   • CATARACT PHACO WITH IOL  8/27/2014    Performed by Thanh Lloyd M.D. at [facility]   • CHOLECYSTECTOMY  12/4/2008    lap arturo   • HYSTERECTOMY, TOTAL ABDOMINAL       No family history on file.  Social History     Socioeconomic History   • Marital status: Single     Spouse name: Not on file   • Number of children: Not on file   • Years of education: Not on file   • Highest education level: Not on file   Occupational History   • Not on file   Social Needs   • Financial resource strain: Not on file   • Food insecurity     Worry: Not on file     Inability: Not on file   • Transportation needs     Medical: Not on file     Non-medical: Not on file   Tobacco Use   • Smoking status: Never Smoker   • Smokeless tobacco: Never Used   Substance and Sexual Activity   • Alcohol use: No   •  "Drug use: No   • Sexual activity: Not on file   Lifestyle   • Physical activity     Days per week: Not on file     Minutes per session: Not on file   • Stress: Not on file   Relationships   • Social connections     Talks on phone: Not on file     Gets together: Not on file     Attends Jainism service: Not on file     Active member of club or organization: Not on file     Attends meetings of clubs or organizations: Not on file     Relationship status: Not on file   • Intimate partner violence     Fear of current or ex partner: Not on file     Emotionally abused: Not on file     Physically abused: Not on file     Forced sexual activity: Not on file   Other Topics Concern   • Not on file   Social History Narrative   • Not on file       Objective:     Vitals: /69   Pulse 75   Ht 1.651 m (5' 5\")   Wt 61.1 kg (134 lb 12.8 oz)   BMI 22.43 kg/m²    General: Alert, pleasant, NAD  HEENT: Normocephalic.   Skin: Warm, dry, no rashes.  Extremities: No leg edema. No discoloration  Neurological: No tremors  Psych:  Affect/mood is normal, judgement is good, memory is intact, grooming is appropriate.    Assessment/Plan:     Елена was seen today for follow-up.    Diagnoses and all orders for this visit:    Adjustment disorder with anxious mood  Slight improvement since her last visit.  Continues to report she is having a hard time with adjusting to new normal.  Continue to be concerned with drowsiness from SSRI's. Concern for anticholinergic effects from hydroxyzine. May try sertraline if propranolol not helpful. Follow up 4-6 weeks or as needed.     Essential tremor  Continues to be problematic for the patient although she reports there is been slight improvement.  Will increase up to 20 mg in the morning as this is when she is more active.  Concern for increasing any higher for possible bradycardia/hypotension.  May trial primidone if propranolol not effective or if becomes symptomatic.     -     propranolol (INDERAL) 20 " MG Tab; Take 1 Tab by mouth every day.      Return in about 4 weeks (around 8/20/2020) for Med Check.          Miya MCPHERSON.

## 2020-08-26 ENCOUNTER — OFFICE VISIT (OUTPATIENT)
Dept: MEDICAL GROUP | Facility: MEDICAL CENTER | Age: 85
End: 2020-08-26
Payer: MEDICARE

## 2020-08-26 VITALS
WEIGHT: 134 LBS | HEIGHT: 65 IN | BODY MASS INDEX: 22.33 KG/M2 | DIASTOLIC BLOOD PRESSURE: 60 MMHG | HEART RATE: 60 BPM | OXYGEN SATURATION: 95 % | SYSTOLIC BLOOD PRESSURE: 112 MMHG | TEMPERATURE: 98 F

## 2020-08-26 DIAGNOSIS — E78.5 DYSLIPIDEMIA: ICD-10-CM

## 2020-08-26 DIAGNOSIS — E55.9 VITAMIN D DEFICIENCY: ICD-10-CM

## 2020-08-26 DIAGNOSIS — F43.22 ADJUSTMENT DISORDER WITH ANXIOUS MOOD: ICD-10-CM

## 2020-08-26 DIAGNOSIS — G25.0 ESSENTIAL TREMOR: ICD-10-CM

## 2020-08-26 DIAGNOSIS — I10 ESSENTIAL HYPERTENSION: ICD-10-CM

## 2020-08-26 DIAGNOSIS — R73.03 PRE-DIABETES: ICD-10-CM

## 2020-08-26 PROCEDURE — 99213 OFFICE O/P EST LOW 20 MIN: CPT | Performed by: NURSE PRACTITIONER

## 2020-08-26 NOTE — PROGRESS NOTES
cc:  Follow up medication.       Subjective:     HPI:     Елена Perla is a 85 y.o. female here to discuss the evaluation and management of.     Adjustment disorder with anxious mood  Slight improvement since her last visit.  Continues to report she is having a hard time with anxiety. Propranolol has been helpful.  She continues to work on deep breathing, staying active as well as dancing.  She also try to do walking in the mall.    Essential tremor  Patient reports having improvement.  States not associated when she comes on her make-up.  States that she is having anxiety her tremors will be increased.      ROS:  Denies any Headache, Blurred Vision, Confusion, Chest pain,  Shortness of breath,  Abdominal pain, Changes of bowel or bladder, Lower ext edema, Fevers, Nights sweats, Weight Changes, Focal weakness or numbness.  And all other systems reviewed and are all negative.  Anxiety.        Current Outpatient Medications:   •  Multiple Vitamins-Minerals (PRESERVISION AREDS PO), Take  by mouth., Disp: , Rfl:   •  propranolol (INDERAL) 20 MG Tab, Take 1 Tab by mouth every day., Disp: 30 Tab, Rfl: 5  •  amLODIPine (NORVASC) 5 MG Tab, Take 1 Tab by mouth every day., Disp: 30 Tab, Rfl: 11  •  hydroCHLOROthiazide (HYDRODIURIL) 25 MG Tab, Take 1 Tab by mouth every day., Disp: 30 Tab, Rfl: 11  •  lisinopril (PRINIVIL) 40 MG tablet, Take 1 Tab by mouth every day., Disp: 30 Tab, Rfl: 11  •  metoprolol (LOPRESSOR) 50 MG Tab, Take 1 Tab by mouth 2 times a day., Disp: 60 Tab, Rfl: 11  •  potassium chloride ER (KLOR-CON 10) 10 MEQ tablet, Take 2 Tabs by mouth 3 times a day., Disp: 180 Tab, Rfl: 11  •  Potassium 99 MG Tab, Take 1 Tab by mouth. 4 tabs a day, Disp: , Rfl:   •  VITAMIN A PO, Take  by mouth., Disp: , Rfl:   •  Calcium Carb-Cholecalciferol (CALCIUM + D3) 600-200 MG-UNIT TABS, Take  by mouth. 5 daily, Disp: , Rfl:   •  Magnesium 250 MG TABS, Take  by mouth every day., Disp: , Rfl:   •  Cyanocobalamin (VITAMIN B-12)  5000 MCG SUBL, Place  under tongue every day., Disp: , Rfl:   •  Garlic 1000 MG CAPS, Take  by mouth every day., Disp: , Rfl:   •  Cholecalciferol (VITAMIN D3) 5000 UNITS CAPS, Take 1 Cap by mouth every day., Disp: , Rfl:   •  Omega-3 Fatty Acids (FISH OIL) 1200 MG CAPS, Take  by mouth. 5 caps daily, Disp: , Rfl:   •  Multiple Vitamins-Minerals (OCUVITE) TABS, Take 1 Tab by mouth every day., Disp: , Rfl:   •  therapeutic multivitamin-minerals (THERAGRAN-M) TABS, Take 1 Tab by mouth every day., Disp: , Rfl:     Allergies   Allergen Reactions   • Percodan  [Kdc:Yellow Dye+Aspirin+Oxycodone] Vomiting   • Percodan-Varsha  [Oxycodone-Aspirin] Vomiting     Other reaction(s): Dizziness       Past Medical History:   Diagnosis Date   • Back pain    • Hypertension    • Leukopenia 12/14/2018   • Neutropenia (HCC) 12/14/2018   • Unspecified cataract     bilateral removal      Past Surgical History:   Procedure Laterality Date   • CATARACT PHACO WITH IOL  9/10/2014    Performed by Thanh Lloyd M.D. at [facility]   • CATARACT PHACO WITH IOL  8/27/2014    Performed by Thanh Lloyd M.D. at [facility]   • CHOLECYSTECTOMY  12/4/2008    lap arturo   • HYSTERECTOMY, TOTAL ABDOMINAL       No family history on file.  Social History     Socioeconomic History   • Marital status: Single     Spouse name: Not on file   • Number of children: Not on file   • Years of education: Not on file   • Highest education level: Not on file   Occupational History   • Not on file   Social Needs   • Financial resource strain: Not on file   • Food insecurity     Worry: Not on file     Inability: Not on file   • Transportation needs     Medical: Not on file     Non-medical: Not on file   Tobacco Use   • Smoking status: Never Smoker   • Smokeless tobacco: Never Used   Substance and Sexual Activity   • Alcohol use: No   • Drug use: No   • Sexual activity: Not on file   Lifestyle   • Physical activity     Days per  "week: Not on file     Minutes per session: Not on file   • Stress: Not on file   Relationships   • Social connections     Talks on phone: Not on file     Gets together: Not on file     Attends Mu-ism service: Not on file     Active member of club or organization: Not on file     Attends meetings of clubs or organizations: Not on file     Relationship status: Not on file   • Intimate partner violence     Fear of current or ex partner: Not on file     Emotionally abused: Not on file     Physically abused: Not on file     Forced sexual activity: Not on file   Other Topics Concern   • Not on file   Social History Narrative   • Not on file       Objective:     Vitals: /60 (BP Location: Left arm, Patient Position: Sitting, BP Cuff Size: Adult)   Pulse 60   Temp 36.7 °C (98 °F) (Tympanic)   Ht 1.651 m (5' 5\")   Wt 60.8 kg (134 lb)   SpO2 95%   BMI 22.30 kg/m²    General: Alert, pleasant, NAD  HEENT: Normocephalic.    Skin: Warm, dry, no rashes.  Extremities: No leg edema. No discoloration  Neurological: No tremors  Psych:  Affect/mood is normal, judgement is good, memory is intact, grooming is appropriate.    Assessment/Plan:     Елена was seen today for medication management.    Diagnoses and all orders for this visit:    Adjustment disorder with anxious mood  Chronic.  Has had some improvement with the addition of the propranolol.  We will continue at this time.   -     CBC WITH DIFFERENTIAL; Future  -     MICROALBUMIN CREAT RATIO URINE; Future    Essential tremor  Improved with propranolol 20 mg.  Denies any dizziness, symptoms of hypotension.  Heart rate within normal in the clinic.  Continue at this time.  Follow-up if symptoms persist or worsen.    Essential hypertension  Stable on current regimen.  Due for follow-up labs in November.  Continue.  -     CBC WITH DIFFERENTIAL; Future  -     Comp Metabolic Panel; Future  -     MICROALBUMIN CREAT RATIO URINE; Future  -     TSH WITH REFLEX TO FT4; " Future    Pre-diabetes  Due for follow-up labs in November.  -     HEMOGLOBIN A1C; Future    Dyslipidemia  Due for labs in November.  -     Lipid Profile; Future    Vitamin D deficiency  -     VITAMIN D,25 HYDROXY; Future          Return in about 6 months (around 2/26/2021) for Long (40 min).          Miya MCPHERSON.

## 2020-11-18 DIAGNOSIS — I10 ESSENTIAL HYPERTENSION: Chronic | ICD-10-CM

## 2020-11-18 RX ORDER — LISINOPRIL 40 MG/1
40 TABLET ORAL DAILY
Qty: 100 TAB | Refills: 2 | Status: SHIPPED | OUTPATIENT
Start: 2020-11-18 | End: 2021-02-16 | Stop reason: SDUPTHER

## 2020-11-24 ENCOUNTER — HOSPITAL ENCOUNTER (OUTPATIENT)
Dept: LAB | Facility: MEDICAL CENTER | Age: 85
End: 2020-11-24
Attending: NURSE PRACTITIONER
Payer: MEDICARE

## 2020-11-24 DIAGNOSIS — I10 ESSENTIAL HYPERTENSION: ICD-10-CM

## 2020-11-24 DIAGNOSIS — E78.5 DYSLIPIDEMIA: ICD-10-CM

## 2020-11-24 DIAGNOSIS — F43.22 ADJUSTMENT DISORDER WITH ANXIOUS MOOD: ICD-10-CM

## 2020-11-24 DIAGNOSIS — E55.9 VITAMIN D DEFICIENCY: ICD-10-CM

## 2020-11-24 DIAGNOSIS — R73.03 PRE-DIABETES: ICD-10-CM

## 2020-11-24 LAB
25(OH)D3 SERPL-MCNC: 77 NG/ML (ref 30–100)
ALBUMIN SERPL BCP-MCNC: 4 G/DL (ref 3.2–4.9)
ALBUMIN/GLOB SERPL: 1.1 G/DL
ALP SERPL-CCNC: 111 U/L (ref 30–99)
ALT SERPL-CCNC: 43 U/L (ref 2–50)
ANION GAP SERPL CALC-SCNC: 8 MMOL/L (ref 7–16)
AST SERPL-CCNC: 32 U/L (ref 12–45)
BASOPHILS # BLD AUTO: 0.7 % (ref 0–1.8)
BASOPHILS # BLD: 0.05 K/UL (ref 0–0.12)
BILIRUB SERPL-MCNC: 0.5 MG/DL (ref 0.1–1.5)
BUN SERPL-MCNC: 18 MG/DL (ref 8–22)
CALCIUM SERPL-MCNC: 9.8 MG/DL (ref 8.5–10.5)
CHLORIDE SERPL-SCNC: 102 MMOL/L (ref 96–112)
CHOLEST SERPL-MCNC: 207 MG/DL (ref 100–199)
CO2 SERPL-SCNC: 30 MMOL/L (ref 20–33)
CREAT SERPL-MCNC: 0.75 MG/DL (ref 0.5–1.4)
CREAT UR-MCNC: 70.76 MG/DL
EOSINOPHIL # BLD AUTO: 0.19 K/UL (ref 0–0.51)
EOSINOPHIL NFR BLD: 2.8 % (ref 0–6.9)
ERYTHROCYTE [DISTWIDTH] IN BLOOD BY AUTOMATED COUNT: 43.1 FL (ref 35.9–50)
EST. AVERAGE GLUCOSE BLD GHB EST-MCNC: 117 MG/DL
FASTING STATUS PATIENT QL REPORTED: NORMAL
GLOBULIN SER CALC-MCNC: 3.5 G/DL (ref 1.9–3.5)
GLUCOSE SERPL-MCNC: 98 MG/DL (ref 65–99)
HBA1C MFR BLD: 5.7 % (ref 0–5.6)
HCT VFR BLD AUTO: 43 % (ref 37–47)
HDLC SERPL-MCNC: 55 MG/DL
HGB BLD-MCNC: 14.3 G/DL (ref 12–16)
IMM GRANULOCYTES # BLD AUTO: 0.01 K/UL (ref 0–0.11)
IMM GRANULOCYTES NFR BLD AUTO: 0.1 % (ref 0–0.9)
LDLC SERPL CALC-MCNC: 105 MG/DL
LYMPHOCYTES # BLD AUTO: 1.59 K/UL (ref 1–4.8)
LYMPHOCYTES NFR BLD: 23.4 % (ref 22–41)
MCH RBC QN AUTO: 32.1 PG (ref 27–33)
MCHC RBC AUTO-ENTMCNC: 33.3 G/DL (ref 33.6–35)
MCV RBC AUTO: 96.6 FL (ref 81.4–97.8)
MICROALBUMIN UR-MCNC: <1.2 MG/DL
MICROALBUMIN/CREAT UR: NORMAL MG/G (ref 0–30)
MONOCYTES # BLD AUTO: 0.47 K/UL (ref 0–0.85)
MONOCYTES NFR BLD AUTO: 6.9 % (ref 0–13.4)
NEUTROPHILS # BLD AUTO: 4.49 K/UL (ref 2–7.15)
NEUTROPHILS NFR BLD: 66.1 % (ref 44–72)
NRBC # BLD AUTO: 0 K/UL
NRBC BLD-RTO: 0 /100 WBC
PLATELET # BLD AUTO: 177 K/UL (ref 164–446)
PMV BLD AUTO: 9.7 FL (ref 9–12.9)
POTASSIUM SERPL-SCNC: 4.1 MMOL/L (ref 3.6–5.5)
PROT SERPL-MCNC: 7.5 G/DL (ref 6–8.2)
RBC # BLD AUTO: 4.45 M/UL (ref 4.2–5.4)
SODIUM SERPL-SCNC: 140 MMOL/L (ref 135–145)
TRIGL SERPL-MCNC: 234 MG/DL (ref 0–149)
TSH SERPL DL<=0.005 MIU/L-ACNC: 2.86 UIU/ML (ref 0.38–5.33)
WBC # BLD AUTO: 6.8 K/UL (ref 4.8–10.8)

## 2020-11-24 PROCEDURE — 82570 ASSAY OF URINE CREATININE: CPT

## 2020-11-24 PROCEDURE — 36415 COLL VENOUS BLD VENIPUNCTURE: CPT

## 2020-11-24 PROCEDURE — 82043 UR ALBUMIN QUANTITATIVE: CPT

## 2020-11-24 PROCEDURE — 85025 COMPLETE CBC W/AUTO DIFF WBC: CPT

## 2020-11-24 PROCEDURE — 80061 LIPID PANEL: CPT

## 2020-11-24 PROCEDURE — 82306 VITAMIN D 25 HYDROXY: CPT

## 2020-11-24 PROCEDURE — 83036 HEMOGLOBIN GLYCOSYLATED A1C: CPT

## 2020-11-24 PROCEDURE — 84443 ASSAY THYROID STIM HORMONE: CPT

## 2020-11-24 PROCEDURE — 80053 COMPREHEN METABOLIC PANEL: CPT

## 2021-01-11 ENCOUNTER — TELEPHONE (OUTPATIENT)
Dept: MEDICAL GROUP | Facility: MEDICAL CENTER | Age: 86
End: 2021-01-11

## 2021-01-11 DIAGNOSIS — Z23 NEED FOR VACCINATION: ICD-10-CM

## 2021-01-11 NOTE — TELEPHONE ENCOUNTER
ANNUAL WELLNESS VISIT PRE-VISIT PLANNING    1.  Reviewed notes from the last office visit: Yes    2.  If any orders were ordered or intended to be done prior to visit (i.e. 6 mos follow-up), do we have results/consult notes or has patient scheduled?        •  Labs - Labs ordered, completed on 11/2020 and results are in chart.  Note: If patient appointment is for lab review and patient did not complete labs, check with provider if OK to reschedule patient until labs completed.       •  Imaging - Imaging was not ordered at last office visit.       •  Referrals - No referrals were ordered at last office visit.    3.  Immunizations were updated in Epic using Reconcile Outside Information activity? Yes       •  Is patient due for Tdap? NO       •  Is patient due for Shingrix? YES. Patient was not notified of copay/out of pocket cost. OOS    4.  Patient is due for the following Health Maintenance Topics:   Health Maintenance Due   Topic Date Due   • COVID-19 Vaccine (1 of 2) Not disussed   • IMM ZOSTER VACCINES (1 of 2) SCHEDULED   • IMM INFLUENZA (1) PENDED       - Patient is up-to-date on all Health Maintenance topics. No records have been requested at this time.    5.  Reviewed/Updated the following with patient:       •   Preferred Pharmacy? Yes       •   Preferred Lab? Yes       •   Preferred Communication? Yes       •   Allergies? Yes       •   Medications? YES. Was Abstract Encounter opened and chart updated? YES       •   Social History? Yes       •   Family History (document living status of immediate family members and if + hx of  cancer, diabetes, hypertension, hyperlipidemia, heart attack, stroke) Yes    6.  Care Team Updated:       •   DME Company (gait device, O2, CPAP, etc.): N\A       •   Other Specialists (eye doctor, derm, GYN, cardiology, endo, etc): YES    7.  Patient was advised: “This is a free wellness visit. The provider will screen for medical conditions to help you stay healthy. If you have other  concerns to address you may be asked to discuss these at a separate visit or there may be an additional fee.”     8.  AHA (Puls[employer]) form printed for Provider? Yes

## 2021-01-12 RX ORDER — POTASSIUM CHLORIDE 750 MG/1
TABLET, FILM COATED, EXTENDED RELEASE ORAL
COMMUNITY
End: 2021-01-25

## 2021-01-13 ENCOUNTER — OFFICE VISIT (OUTPATIENT)
Dept: MEDICAL GROUP | Facility: MEDICAL CENTER | Age: 86
End: 2021-01-13
Payer: MEDICARE

## 2021-01-13 VITALS
SYSTOLIC BLOOD PRESSURE: 160 MMHG | TEMPERATURE: 97.1 F | HEIGHT: 65 IN | HEART RATE: 69 BPM | BODY MASS INDEX: 22.66 KG/M2 | WEIGHT: 136.02 LBS | OXYGEN SATURATION: 97 % | DIASTOLIC BLOOD PRESSURE: 70 MMHG

## 2021-01-13 DIAGNOSIS — E55.9 VITAMIN D DEFICIENCY: ICD-10-CM

## 2021-01-13 DIAGNOSIS — G89.29 CHRONIC BILATERAL LOW BACK PAIN WITH BILATERAL SCIATICA: ICD-10-CM

## 2021-01-13 DIAGNOSIS — E87.6 HYPOKALEMIA: Chronic | ICD-10-CM

## 2021-01-13 DIAGNOSIS — S46.002A INJURY OF LEFT ROTATOR CUFF, INITIAL ENCOUNTER: ICD-10-CM

## 2021-01-13 DIAGNOSIS — G25.0 ESSENTIAL TREMOR: ICD-10-CM

## 2021-01-13 DIAGNOSIS — Z00.00 MEDICARE ANNUAL WELLNESS VISIT, SUBSEQUENT: ICD-10-CM

## 2021-01-13 DIAGNOSIS — E78.5 DYSLIPIDEMIA: Chronic | ICD-10-CM

## 2021-01-13 DIAGNOSIS — F43.22 ADJUSTMENT DISORDER WITH ANXIOUS MOOD: ICD-10-CM

## 2021-01-13 DIAGNOSIS — M85.852 OSTEOPENIA OF NECK OF LEFT FEMUR: ICD-10-CM

## 2021-01-13 DIAGNOSIS — I10 ESSENTIAL HYPERTENSION: Chronic | ICD-10-CM

## 2021-01-13 DIAGNOSIS — M54.42 CHRONIC BILATERAL LOW BACK PAIN WITH BILATERAL SCIATICA: ICD-10-CM

## 2021-01-13 DIAGNOSIS — R73.03 PRE-DIABETES: Chronic | ICD-10-CM

## 2021-01-13 DIAGNOSIS — M54.41 CHRONIC BILATERAL LOW BACK PAIN WITH BILATERAL SCIATICA: ICD-10-CM

## 2021-01-13 PROCEDURE — G0439 PPPS, SUBSEQ VISIT: HCPCS | Performed by: NURSE PRACTITIONER

## 2021-01-13 PROCEDURE — 8041 PR SCP AHA: Performed by: NURSE PRACTITIONER

## 2021-01-13 ASSESSMENT — ACTIVITIES OF DAILY LIVING (ADL): BATHING_REQUIRES_ASSISTANCE: 0

## 2021-01-13 ASSESSMENT — PATIENT HEALTH QUESTIONNAIRE - PHQ9: CLINICAL INTERPRETATION OF PHQ2 SCORE: 0

## 2021-01-13 ASSESSMENT — ENCOUNTER SYMPTOMS: GENERAL WELL-BEING: GOOD

## 2021-01-13 ASSESSMENT — FIBROSIS 4 INDEX: FIB4 SCORE: 2.34

## 2021-01-13 NOTE — PROGRESS NOTES
Chief Complaint   Patient presents with   • Annual Wellness Visit         HPI:  Елена is a 85 y.o. here for Medicare Annual Wellness Visit      1. Medicare annual wellness visit, subsequent  Here for annual Medicare wellness.    2. Injury of left rotator cuff, initial encounter  Mentions about 3 months ago she was cleaning her toilet and her left hand had slipped and she fell to the ground.  Left shoulder has been sore since.  She does have good range of motion.  No swelling.     3. Chronic bilateral low back pain with bilateral sciatica  Patient expresses chronic pain.  States that she will be having an epidural on the 28th with Dr. Jones.  Mentions that this is her 12th epidural.  This interferes with her daily activities as well as her exercise.  Frustrated by her pain.  Last MRI was in 2014.  Multilevel degenerative changes in the spine.      Patient Active Problem List    Diagnosis Date Noted   • Adjustment disorder with anxious mood 02/01/2021   • Essential tremor 02/01/2021   • Vitamin D deficiency 02/01/2021   • Chronic bilateral low back pain with bilateral sciatica 01/15/2020   • Osteopenia of neck of left femur 04/05/2019   • Hypokalemia 12/14/2018   • Dyslipidemia 12/14/2018   • Osteochondropathy 12/14/2018   • Essential hypertension 12/14/2018   • Pre-diabetes 12/14/2018   • Senile nuclear sclerosis 08/27/2014       Current Outpatient Medications   Medication Sig Dispense Refill   • lisinopril (PRINIVIL) 40 MG tablet Take 1 Tab by mouth every day. 100 Tab 2   • Multiple Vitamins-Minerals (PRESERVISION AREDS PO) Take  by mouth.     • amLODIPine (NORVASC) 5 MG Tab Take 1 Tab by mouth every day. 30 Tab 11   • hydroCHLOROthiazide (HYDRODIURIL) 25 MG Tab Take 1 Tab by mouth every day. 30 Tab 11   • metoprolol (LOPRESSOR) 50 MG Tab Take 1 Tab by mouth 2 times a day. 60 Tab 11   • potassium chloride ER (KLOR-CON 10) 10 MEQ tablet Take 2 Tabs by mouth 3 times a day. 180 Tab 11   • VITAMIN A PO Take  by mouth.      • therapeutic multivitamin-minerals (THERAGRAN-M) TABS Take 1 Tab by mouth every day.     • Calcium Carb-Cholecalciferol (CALCIUM + D3) 600-200 MG-UNIT TABS Take  by mouth. 5 daily     • Magnesium 250 MG TABS Take  by mouth every day.     • Cyanocobalamin (VITAMIN B-12) 5000 MCG SUBL Place  under tongue every day.     • Garlic 1000 MG CAPS Take  by mouth every day.     • Cholecalciferol (VITAMIN D3) 5000 UNITS CAPS Take 1 Cap by mouth every day.     • Omega-3 Fatty Acids (FISH OIL) 1200 MG CAPS Take  by mouth. 5 caps daily     • propranolol (INDERAL) 20 MG Tab Take 1 Tab by mouth every day. 100 Tab 2   • Potassium 99 MG Tab Take 1 Tab by mouth. 4 tabs a day       No current facility-administered medications for this visit.         Patient is taking medications as noted in medication list.  Current supplements as per medication list.     Allergies: Percodan  [kdc:yellow dye+aspirin+oxycodone] and Percodan-sepideh  [oxycodone-aspirin]    Current social contact/activities: dancing, walking    Is patient current with immunizations? Yes.    She  reports that she has never smoked. She has never used smokeless tobacco. She reports that she does not drink alcohol or use drugs.  Counseling given: Not Answered        DPA/Advanced directive: Patient has Living Will, but it is not on file. Instructed to bring in a copy to scan into their chart. Still in process filling it out. Advised to bring a copy once it's finished.    ROS:    Gait: Uses no assistive device   Ostomy: No   Other tubes: No   Amputations: No   Chronic oxygen use No   Last eye exam 12/2020   Wears hearing aids: No   : Denies any urinary leakage during the last 6 months    Annual Health Assessment Questions:    1.  Are you currently engaging in any exercise or physical activity? Yes    2.  How would you describe your mood or emotional well-being today? good    3.  Have you had any falls in the last year? No    4.  Have you noticed any problems with your balance  or had difficulty walking? No    5.  In the last six months have you experienced any leakage of urine? No    6. DPA/Advanced Directive: Patient has Living Will, but it is not on file. Instructed to bring in a copy to scan into their chart. Still in process filling it out. Advised to bring a copy once it's finished.        Screening:        Depression Screening    Little interest or pleasure in doing things?  0 - not at all  Feeling down, depressed, or hopeless? 0 - not at all  Patient Health Questionnaire Score: 0    If depressive symptoms identified deferred to follow up visit unless specifically addressed in assessment and plan.    Interpretation of PHQ-9 Total Score   Score Severity   1-4 No Depression   5-9 Mild Depression   10-14 Moderate Depression   15-19 Moderately Severe Depression   20-27 Severe Depression    Screening for Cognitive Impairment    Three Minute Recall (river, nation, finger)  2/3    Sushant clock face with all 12 numbers and set the hands to show 10 past 11.  Yes    If cognitive concerns identified, deferred for follow up unless specifically addressed in assessment and plan.    Fall Risk Assessment    Has the patient had two or more falls in the last year or any fall with injury in the last year?  No  If fall risk identified, deferred for follow up unless specifically addressed in assessment and plan.    Safety Assessment    Throw rugs on floor.  Yes  Handrails on all stairs.  Yes  Good lighting in all hallways.  Yes  Difficulty hearing.  No  Patient counseled about all safety risks that were identified.    Functional Assessment ADLs    Are there any barriers preventing you from cooking for yourself or meeting nutritional needs?  No.    Are there any barriers preventing you from driving safely or obtaining transportation?  No.    Are there any barriers preventing you from using a telephone or calling for help?  No.    Are there any barriers preventing you from shopping?  No.    Are there any  barriers preventing you from taking care of your own finances?  No.    Are there any barriers preventing you from managing your medications?  No.    Are there any barriers preventing you from showering, bathing or dressing yourself?  No.    Are you currently engaging in any exercise or physical activity?  Yes.     What is your perception of your health?  Good.    Health Maintenance Summary                COVID-19 Vaccine Overdue 7/20/1951     IMM ZOSTER VACCINES Overdue 7/20/1985     MAMMOGRAM Next Due 7/14/2021      Done 7/14/2020 MA-SCREENING MAMMO BILAT W/TOMOSYNTHESIS W/CAD     Patient has more history with this topic...    Annual Wellness Visit Next Due 2/2/2022      Done 2/1/2021 SUBSEQUENT ANNUAL WELLNESS VISIT-INCLUDES PPPS ()     Patient has more history with this topic...    IMM DTaP/Tdap/Td Vaccine Next Due 4/20/2028      Done 4/20/2018 Imm Admin: Tdap Vaccine          Patient Care Team:  MADDY Knutson as PCP - General (Nurse Practitioner)  Dr.Forrest Robert Roblero as      Social History     Tobacco Use   • Smoking status: Never Smoker   • Smokeless tobacco: Never Used   Substance Use Topics   • Alcohol use: No   • Drug use: No     Family History   Problem Relation Age of Onset   • Diabetes Neg Hx    • Hyperlipidemia Neg Hx    • Hypertension Neg Hx      She  has a past medical history of Back pain, Hypertension, Leukopenia (12/14/2018), Neutropenia (HCC) (12/14/2018), and Unspecified cataract.   Past Surgical History:   Procedure Laterality Date   • BLOCK EPIDURAL STEROID INJECTION Right 1/28/2021    Procedure: INJECTION, STEROID, EPIDURAL;  Surgeon: CHRISSY Jones M.D.;  Location[facility];  Service: Pain Management   • CATARACT PHACO WITH IOL  9/10/2014    Performed by Thanh Lloyd M.D. at SURGERY SAME DAY [facility]   • CATARACT PHACO WITH IOL  8/27/2014    Performed by Thanh Lloyd M.D. at SURGERY SAME DAY  [facility]   • CHOLECYSTECTOMY  12/4/2008    lap arturo   • HYSTERECTOMY, TOTAL ABDOMINAL         Exam:     /70 (BP Location: Right arm, Patient Position: Sitting, BP Cuff Size: Adult)   Pulse 69   Temp 36.2 °C (97.1 °F) (Temporal)   Ht 1.651 m (5' 5\")   Wt 61.7 kg (136 lb 0.4 oz)   SpO2 97%  Body mass index is 22.64 kg/m².    Hearing good.    Dentition good  Alert, oriented in no acute distress.  Eye contact is good, speech goal directed, affect calm      Assessment and Plan. The following treatment and monitoring plan is recommended:    1. Medicare annual wellness visit, subsequent   Have reviewed annual screenings and recommended immunizations. Subsequent Annual Wellness Visit - Includes PPPS ()   2. Injury of left rotator cuff, initial encounter   Initial presentation.  Good ROM, no swelling, no crepitus, no bruising.  Recommend home PT exercises.  Follow-up symptoms persist or worsen. Subsequent Annual Wellness Visit - Includes PPPS ()   3. Chronic bilateral low back pain with bilateral sciatica   Chronic.  Upcoming epidural with pain management. Subsequent Annual Wellness Visit - Includes PPPS ()   4. Essential hypertension   Chronic.  Continue current regimen.  Discussed heart healthy diet. Encouraged to avoid high sodium foods. Subsequent Annual Wellness Visit - Includes PPPS ()   5. Dyslipidemia   Chronic.  Not on statin therapy.  .  Have discussed heart healthy diet. Subsequent Annual Wellness Visit - Includes PPPS ()   6. Pre-diabetes   This is stable.  A1c 5.7.  Routine surveillance.   Subsequent Annual Wellness Visit - Includes PPPS ()   7. Osteopenia of neck of left femur   Last DEXA scan 2015.  Recommend calcium and vitamin D as well as weightbearing exercises. Subsequent Annual Wellness Visit - Includes PPPS ()   8. Adjustment disorder with anxious mood   Chronic.  No exacerbations in her mood.  Propranolol has been helpful.  Continue with staying " active. Subsequent Annual Wellness Visit - Includes PPPS ()   9. Essential tremor   Stable.  Continue propranolol. Subsequent Annual Wellness Visit - Includes PPPS ()   10. Vitamin D deficiency   Stable.  Continue supplementation. Subsequent Annual Wellness Visit - Includes PPPS ()   11. Hypokalemia   Stable.  Continue supplementation.   Subsequent Annual Wellness Visit - Includes PPPS ()         Services suggested: No services needed at this time  Health Care Screening recommendations as per orders if indicated.  Referrals offered: PT/OT/Nutrition counseling/Behavioral Health/Smoking cessation as per orders if indicated.    Discussion today about general wellness and lifestyle habits:    · Prevent falls and reduce trip hazards; Cautioned about securing or removing rugs.  · Have a working fire alarm and carbon monoxide detector;   · Engage in regular physical activity and social activities       Follow-up: Return in about 3 months (around 4/13/2021).

## 2021-01-13 NOTE — LETTER
[facility]  JUAN C KnutsonP.RRonaN.  [address] [facility] [address]  [place] [address]  Fax: [phone]   Authorization for Release/Disclosure of   Protected Health Information   Name: MULUGETA MISHRA : 1935 SSN: [id number]   Address: [address] [place], [place] [address] [address] Phone:    [phone] (home)    I authorize the entity listed below to release/disclose the PHI below to:   [facility]/Miya Xiong A[facility]R.CRYSTAL and JUAN C KnutsonP.RLELAND.   Provider or Entity Name: Dr. Lloyd     [facility]   Phone: [phone]      Fax: [phone]     Reason for request: continuity of care   Information to be released:    [  ] LAST COLONOSCOPY,  including any PATH REPORT and follow-up  [  ] LAST FIT/COLOGUARD RESULT [  ] LAST DEXA  [  ] LAST MAMMOGRAM  [  ] LAST PAP  [  ] LAST LABS [X] RETINA EXAM REPORT  [  ] IMMUNIZATION RECORDS  [  ] Release all info      [  ] Check here and initial the line next to each item to release ALL health information INCLUDING  _____ Care and treatment for drug and / or alcohol abuse  _____ HIV testing, infection status, or AIDS  _____ Genetic Testing    DATES OF SERVICE OR TIME PERIOD TO BE DISCLOSED: _____________  I understand and acknowledge that:  * This Authorization may be revoked at any time by you in writing, except if your health information has already been used or disclosed.  * Your health information that will be used or disclosed as a result of you signing this authorization could be re-disclosed by the recipient. If this occurs, your re-disclosed health information may no longer be protected by State or Federal laws.  * You may refuse to sign this Authorization. Your refusal will not affect your ability to obtain treatment.  * This Authorization becomes effective upon signing and will  on (date) __________.      If no date is indicated, this Authorization will  one (1) year from the signature date.    Name: Mulugeta  Pan    Continuity of Care   Date:     1/29/2021       PLEASE FAX REQUESTED RECORDS BACK TO: [phone]

## 2021-01-26 NOTE — PREPROCEDURE INSTRUCTIONS
PAT call made 1/26/2021 at 1316, spoke with pt after confirming 2 pt identifiers. Health hx, medications, allergies reviewed with pt, as well as pre-procedure/sedation instructions. Respiratory screen completed. Pt denies being sick/symptomatic (fever, cough, SOB, diarrhea) w/in last 2 wks, or having close contact w/ sick individuals in the past 2 wks, denies positive covid test test in last 3 wks. Pt education to notify her MD should she become symptomatic prior to procedure. Pt verbalizes understanding. Pt told to bring a form fitting mask and the she will be wearing it entire stay. Informed pt it is recommended pt self isolate for 72 hrs or from time of this call until procedure, also that we request the  to remain on site until pt is discharged. Pt verbalizes understanding.

## 2021-01-28 ENCOUNTER — APPOINTMENT (OUTPATIENT)
Dept: RADIOLOGY | Facility: REHABILITATION | Age: 86
End: 2021-01-28
Attending: PHYSICAL MEDICINE & REHABILITATION
Payer: MEDICARE

## 2021-01-28 ENCOUNTER — HOSPITAL ENCOUNTER (OUTPATIENT)
Facility: REHABILITATION | Age: 86
End: 2021-01-28
Attending: PHYSICAL MEDICINE & REHABILITATION | Admitting: PHYSICAL MEDICINE & REHABILITATION
Payer: MEDICARE

## 2021-01-28 VITALS
DIASTOLIC BLOOD PRESSURE: 65 MMHG | OXYGEN SATURATION: 99 % | HEART RATE: 70 BPM | HEIGHT: 65 IN | TEMPERATURE: 98.2 F | BODY MASS INDEX: 22.64 KG/M2 | SYSTOLIC BLOOD PRESSURE: 142 MMHG | RESPIRATION RATE: 12 BRPM

## 2021-01-28 PROCEDURE — 700111 HCHG RX REV CODE 636 W/ 250 OVERRIDE (IP)

## 2021-01-28 PROCEDURE — 700101 HCHG RX REV CODE 250

## 2021-01-28 PROCEDURE — 62323 NJX INTERLAMINAR LMBR/SAC: CPT

## 2021-01-28 PROCEDURE — 700117 HCHG RX CONTRAST REV CODE 255

## 2021-01-28 RX ORDER — SODIUM BICARBONATE 42 MG/ML
INJECTION, SOLUTION INTRAVENOUS
Status: COMPLETED
Start: 2021-01-28 | End: 2021-01-28

## 2021-01-28 RX ORDER — METHYLPREDNISOLONE ACETATE 80 MG/ML
INJECTION, SUSPENSION INTRA-ARTICULAR; INTRALESIONAL; INTRAMUSCULAR; SOFT TISSUE
Status: COMPLETED
Start: 2021-01-28 | End: 2021-01-28

## 2021-01-28 RX ORDER — LIDOCAINE HYDROCHLORIDE 20 MG/ML
INJECTION, SOLUTION EPIDURAL; INFILTRATION; INTRACAUDAL; PERINEURAL
Status: COMPLETED
Start: 2021-01-28 | End: 2021-01-28

## 2021-01-28 RX ADMIN — IOHEXOL 2 ML: 240 INJECTION, SOLUTION INTRATHECAL; INTRAVASCULAR; INTRAVENOUS; ORAL at 09:03

## 2021-01-28 RX ADMIN — LIDOCAINE HYDROCHLORIDE 3 ML: 20 INJECTION, SOLUTION EPIDURAL; INFILTRATION; INTRACAUDAL; PERINEURAL at 09:01

## 2021-01-28 RX ADMIN — LIDOCAINE HYDROCHLORIDE 1 ML: 20 INJECTION, SOLUTION EPIDURAL; INFILTRATION; INTRACAUDAL; PERINEURAL at 09:05

## 2021-01-28 RX ADMIN — METHYLPREDNISOLONE ACETATE 80 MG: 80 INJECTION, SUSPENSION INTRA-ARTICULAR; INTRALESIONAL; INTRAMUSCULAR; SOFT TISSUE at 09:05

## 2021-01-28 ASSESSMENT — PAIN DESCRIPTION - PAIN TYPE
TYPE: CHRONIC PAIN
TYPE: CHRONIC PAIN

## 2021-01-28 NOTE — PROGRESS NOTES
Pt identified. Vitals recorded. Consent signed and procedure verified with patient. Education overview. H&P is updated and pre assessment is documented. PMH and medications reviewed. No Acs or HTN meds taken.  Kiera Boothe RN, BSN

## 2021-01-28 NOTE — PROCEDURES
DATE OF PROCEDURE:  01/28/2021     PROCEDURE NOTE     NAME OF PROCEDURE:  Left L5-S1 interlaminar epidural steroid injection with 80   mg of Depo-Medrol under fluoroscopic guidance.     INDICATION:  The patient [facility] 85[facility]    She has previously had lumbar epidurals, which worked well for her, most   recently was 04/26/2018.     Given her excellent response, but return of symptoms follow up epidural was   recommended for today's procedure.     DESCRIPTION OF PROCEDURE:  After appropriate informed consent was obtained,   the patient was placed prone on the table.  Her skin was thoroughly cleansed   with Betadine swabs x3, wiped off sterile gauze, and subcutaneous   intermuscular and a ligamentous region was anesthetized with lidocaine.     A 3-1/2 inch 20 gauge Tuohy catheter was directed under fluoroscopic guidance   at the lamina.  Once the lamina was detected, the catheter was directed   cephalad medially and loss of resistance technique was used to determine the   epidural space.     EPIDUROGRAM:  A 1 mL of Omnipaque was placed just left of midline at L5-S1.    This is viewed on the AP and lateral projection appeared to be subligamentous   with good cephalad and caudad flow and the flow was unrestricted.     An 80 mg of Depo-Medrol and 0.5 mL of 2% lidocaine was placed at the L5-S1   interval.     The patient tolerated the procedure without any complications.     In the recovery area, she was doing well, reporting no complications.  Normal   neurologic exam and some relief of pain.     She will follow up in the office in the next few weeks to monitor response to   injection.        ______________________________  MD CARY SOSA/AMBER    DD:  01/28/2021 09:32  DT:  01/28/2021 11:36    Job#:  [id number]    CC:Ellen Saavedra MD

## 2021-01-28 NOTE — OR SURGEON
Immediate Post OP Note    PreOp Diagnosis: Low back pain    PostOp Diagnosis: same    Procedure(s):  INJECTION, STEROID, EPIDURAL - Wound Class: Clean    Surgeon(s):  CHRISSY Jones M.D.    Anesthesiologist/Type of Anesthesia:  No anesthesia staff entered./Local    Surgical Staff:  Circulator: Mel Mcgill R.N.  Scrub Person: Clifford Michele C.N.A.  Radiology Technologist: Jarrett Obrien; Tami Priest    Specimens removed if any:  * No specimens in log *    Estimated Blood Loss: None    Findings: None    Complications: None        1/28/2021 8:42 AM CHRISSY Jones M.D.

## 2021-01-28 NOTE — H&P
Physical Medicine & Rehab History & Physical Note    Date  1/28/2021    Primary Care Physician  MADDY Knutson  Pre-Op Diagnosis Codes:     * Low back pain, unspecified back pain laterality, unspecified chronicity, unspecified whether sciatica present [M54.5]    HPI  This is a 85 y.o. female who presented with low back pain    Past Medical History:   Diagnosis Date   • Back pain    • Hypertension    • Leukopenia 12/14/2018   • Neutropenia (HCC) 12/14/2018   • Unspecified cataract     bilateral removal        Past Surgical History:   Procedure Laterality Date   • CATARACT PHACO WITH IOL  9/10/2014    Performed by Thanh Lloyd M.D. at SURGERY SAME DAY [facility]   • CATARACT PHACO WITH IOL  8/27/2014    Performed by Thanh Lloyd M.D. at SURGERY SAME DAY [facility]   • CHOLECYSTECTOMY  12/4/2008    lap arturo   • HYSTERECTOMY, TOTAL ABDOMINAL         Current Facility-Administered Medications   Medication Dose Route Frequency Provider Last Rate Last Admin   • METHYLPREDNISOLONE ACETATE 80 MG/ML INJ SUSP            • LIDOCAINE HCL (PF) 2 % INJ SOLN            • SODIUM BICARBONATE 4.2 % IV SOLN            • IOHEXOL 240 MG/ML INJ SOLN                Social History     Socioeconomic History   • Marital status: Single     Spouse name: Not on file   • Number of children: Not on file   • Years of education: Not on file   • Highest education level: Not on file   Occupational History     Employer: OTHER   Social Needs   • Financial resource strain: Not on file   • Food insecurity     Worry: Not on file     Inability: Not on file   • Transportation needs     Medical: Not on file     Non-medical: Not on file   Tobacco Use   • Smoking status: Never Smoker   • Smokeless tobacco: Never Used   Substance and Sexual Activity   • Alcohol use: No   • Drug use: No   • Sexual activity: Not Currently   Lifestyle   • Physical activity     Days per week: Not on file     Minutes per session: Not on file   •  Stress: Not on file   Relationships   • Social connections     Talks on phone: Not on file     Gets together: Not on file     Attends Quaker service: Not on file     Active member of club or organization: Not on file     Attends meetings of clubs or organizations: Not on file     Relationship status: Not on file   • Intimate partner violence     Fear of current or ex partner: Not on file     Emotionally abused: Not on file     Physically abused: Not on file     Forced sexual activity: Not on file   Other Topics Concern   • Not on file   Social History Narrative   • Not on file       Family History   Problem Relation Age of Onset   • Diabetes Neg Hx    • Hyperlipidemia Neg Hx    • Hypertension Neg Hx        Allergies  Percodan  [kdc:yellow dye+aspirin+oxycodone] and Percodan-sepideh  [oxycodone-aspirin]    Review of Systems  Negative    Physical Exam    Vital Signs  Blood Pressure : (!) 172/77   Temperature: 36.8 °C (98.2 °F)   Pulse: 70   Respiration: 12   Pulse Oximetry: 96 %       Labs:                    Radiology:  DX-PORTABLE FLUOROSCOPY < 1 HOUR    (Results Pending)         Assessment/Plan:  Pre-Op Diagnosis Codes:     * Low back pain, unspecified back pain laterality, unspecified chronicity, unspecified whether sciatica present [M54.5]  Procedure(s) with comments:  INJECTION, STEROID, EPIDURAL - RIGHT L5-S1 INTERLAMINAR INJECTION

## 2021-01-28 NOTE — PROGRESS NOTES
Pt discharged home with care from self. Vital signs and post assessment documented. Education reviewed and all questions answered. Pt able to ambulate appropriately. All belongings returned to patient.     Kiera Boothe RN BSN

## 2021-01-28 NOTE — NON-PROVIDER
Patient identified. Site and procedure confirmed and savanna by Dr. Jones .Medication allergies. Reviewed pertinent medical history ( HTN, hypokalemia, osteochondropathy ).Timeout completed, team and patient agreed.

## 2021-01-28 NOTE — NON-PROVIDER
Onto procedure table  on prone positioned and necessary monitoring equipment connected ( pulse oximeter ) with the help by  team  ( CNA, X-ray TECH and RN ). Hands supported with stool underneath headrest of the bed , lower extremities supported with pillow.

## 2021-01-29 RX ORDER — PROPRANOLOL HYDROCHLORIDE 20 MG/1
20 TABLET ORAL DAILY
Qty: 100 TAB | Refills: 2 | Status: SHIPPED
Start: 2021-01-29 | End: 2021-12-16

## 2021-02-01 PROBLEM — G25.0 ESSENTIAL TREMOR: Status: ACTIVE | Noted: 2021-02-01

## 2021-02-01 PROBLEM — E55.9 VITAMIN D DEFICIENCY: Status: ACTIVE | Noted: 2021-02-01

## 2021-02-01 PROBLEM — F43.22 ADJUSTMENT DISORDER WITH ANXIOUS MOOD: Status: ACTIVE | Noted: 2021-02-01

## 2021-02-16 DIAGNOSIS — E87.6 HYPOKALEMIA: Chronic | ICD-10-CM

## 2021-02-16 DIAGNOSIS — I10 ESSENTIAL HYPERTENSION: Chronic | ICD-10-CM

## 2021-02-16 RX ORDER — POTASSIUM CHLORIDE 750 MG/1
20 TABLET, FILM COATED, EXTENDED RELEASE ORAL 3 TIMES DAILY
Qty: 180 TABLET | Refills: 3 | Status: SHIPPED | OUTPATIENT
Start: 2021-02-16 | End: 2021-06-11 | Stop reason: SDUPTHER

## 2021-02-16 RX ORDER — POTASSIUM CHLORIDE 750 MG/1
20 TABLET, FILM COATED, EXTENDED RELEASE ORAL 3 TIMES DAILY
Qty: 180 TABLET | Refills: 3 | Status: CANCELLED | OUTPATIENT
Start: 2021-02-16

## 2021-02-16 NOTE — TELEPHONE ENCOUNTER
Patient Seen: 1/13/2021 With MADDY Knutson  Next Appointment: None  Was the patient seen in the last year in this department? Yes     Does patient have an active prescription for medications requested? No     Received Request Via: Pharmacy

## 2021-02-16 NOTE — TELEPHONE ENCOUNTER
Received request via: Patient    Was the patient seen in the last year in this department? Yes    Does the patient have an active prescription (recently filled or refills available) for medication(s) requested? No   Pt called in asking if you would refill the medication for the rest of the year please advise.

## 2021-02-17 RX ORDER — AMLODIPINE BESYLATE 5 MG/1
5 TABLET ORAL DAILY
Qty: 30 TABLET | Refills: 11 | Status: SHIPPED | OUTPATIENT
Start: 2021-02-17 | End: 2021-12-16 | Stop reason: SDUPTHER

## 2021-02-17 RX ORDER — HYDROCHLOROTHIAZIDE 25 MG/1
25 TABLET ORAL DAILY
Qty: 30 TABLET | Refills: 11 | Status: SHIPPED | OUTPATIENT
Start: 2021-02-17 | End: 2021-12-16 | Stop reason: SDUPTHER

## 2021-02-17 RX ORDER — LISINOPRIL 40 MG/1
40 TABLET ORAL DAILY
Qty: 100 TABLET | Refills: 2 | Status: SHIPPED | OUTPATIENT
Start: 2021-02-17 | End: 2021-12-10 | Stop reason: SDUPTHER

## 2021-02-17 RX ORDER — METOPROLOL TARTRATE 50 MG/1
50 TABLET, FILM COATED ORAL 2 TIMES DAILY
Qty: 60 TABLET | Refills: 11 | Status: SHIPPED | OUTPATIENT
Start: 2021-02-17 | End: 2021-12-16 | Stop reason: SDUPTHER

## 2021-02-17 NOTE — TELEPHONE ENCOUNTER
Hello,    Pt called today regarding medication refill. Wants to know why it has not been refilled.    Please adv,    Thank you

## 2021-03-12 ENCOUNTER — APPOINTMENT (OUTPATIENT)
Dept: RADIOLOGY | Facility: MEDICAL CENTER | Age: 86
End: 2021-03-12
Attending: PHYSICAL MEDICINE & REHABILITATION
Payer: MEDICARE

## 2021-03-23 ENCOUNTER — HOSPITAL ENCOUNTER (OUTPATIENT)
Dept: RADIOLOGY | Facility: MEDICAL CENTER | Age: 86
End: 2021-03-23
Attending: PHYSICAL MEDICINE & REHABILITATION
Payer: MEDICARE

## 2021-03-23 DIAGNOSIS — M54.17 LUMBOSACRAL NEURITIS: ICD-10-CM

## 2021-03-23 PROCEDURE — 72148 MRI LUMBAR SPINE W/O DYE: CPT | Mod: MH

## 2021-06-11 DIAGNOSIS — E87.6 HYPOKALEMIA: Chronic | ICD-10-CM

## 2021-06-11 RX ORDER — POTASSIUM CHLORIDE 750 MG/1
20 TABLET, FILM COATED, EXTENDED RELEASE ORAL 3 TIMES DAILY
Qty: 180 TABLET | Refills: 3 | Status: SHIPPED | OUTPATIENT
Start: 2021-06-11 | End: 2021-10-11 | Stop reason: SDUPTHER

## 2021-08-26 ENCOUNTER — TELEPHONE (OUTPATIENT)
Dept: HEALTH INFORMATION MANAGEMENT | Facility: OTHER | Age: 86
End: 2021-08-26

## 2021-08-26 NOTE — TELEPHONE ENCOUNTER
Outcome: No answer. No Message -  Comprehensive Health Assessment     HealthConnect Verified: yes    Attempt # 1

## 2021-09-07 ENCOUNTER — TELEPHONE (OUTPATIENT)
Dept: HEALTH INFORMATION MANAGEMENT | Facility: OTHER | Age: 86
End: 2021-09-07

## 2021-09-07 NOTE — TELEPHONE ENCOUNTER
1. Attempt #:1    2. HealthConnect Verified: yes    3. Verify PCP: yes    4. Review Care Team: no      6. Reviewed/Updated the following with patient:       •   Communication Preference Obtained? NO       •   Preferred Pharmacy? NO       •   Preferred Lab? NO       •   Family History (document living status of immediate family members and if + hx of cancer, diabetes, hypertension, hyperlipidemia, heart attack, stroke) NO    7. Annual Wellness Visit Scheduling  · Scheduling Status:Scheduled     8. Care Gap Scheduling (Attempt to Schedule EACH Overdue Care Gap!)     Health Maintenance Due   Topic Date Due   • COVID-19 Vaccine (1) Never done   • IMM ZOSTER VACCINES (1 of 2) Never done   • MAMMOGRAM  07/14/2021   • IMM INFLUENZA (1) 09/01/2021        Scheduled patient for Annual Wellness Visit         13. Patient was advised: “This is a free wellness visit. The provider will screen for medical conditions to help you stay healthy. If you have other concerns to address you may be asked to discuss these at a separate visit or there may be an additional fee.”     14. Patient was informed to arrive 15 min prior to their scheduled appointment and bring in their medication bottles.

## 2021-09-14 ENCOUNTER — OFFICE VISIT (OUTPATIENT)
Dept: MEDICAL GROUP | Facility: MEDICAL CENTER | Age: 86
End: 2021-09-14
Payer: MEDICARE

## 2021-09-14 VITALS
DIASTOLIC BLOOD PRESSURE: 76 MMHG | WEIGHT: 130 LBS | TEMPERATURE: 98.5 F | HEIGHT: 65 IN | SYSTOLIC BLOOD PRESSURE: 128 MMHG | RESPIRATION RATE: 16 BRPM | HEART RATE: 75 BPM | OXYGEN SATURATION: 96 % | BODY MASS INDEX: 21.66 KG/M2

## 2021-09-14 DIAGNOSIS — I10 ESSENTIAL HYPERTENSION: ICD-10-CM

## 2021-09-14 DIAGNOSIS — R19.7 DIARRHEA, UNSPECIFIED TYPE: ICD-10-CM

## 2021-09-14 DIAGNOSIS — R73.03 PRE-DIABETES: ICD-10-CM

## 2021-09-14 DIAGNOSIS — E78.5 DYSLIPIDEMIA: ICD-10-CM

## 2021-09-14 PROCEDURE — 99213 OFFICE O/P EST LOW 20 MIN: CPT | Performed by: NURSE PRACTITIONER

## 2021-09-14 RX ORDER — AMOXICILLIN 500 MG/1
500 CAPSULE ORAL 2 TIMES DAILY
Qty: 20 CAPSULE | Refills: 0 | Status: SHIPPED | OUTPATIENT
Start: 2021-09-14

## 2021-09-14 RX ORDER — KETOROLAC TROMETHAMINE 5 MG/ML
SOLUTION OPHTHALMIC
COMMUNITY
Start: 2021-06-18 | End: 2023-11-07

## 2021-09-14 ASSESSMENT — FIBROSIS 4 INDEX: FIB4 SCORE: 2.37

## 2021-09-14 NOTE — PATIENT INSTRUCTIONS
Food Choices to Help Relieve Diarrhea, Adult  When you have diarrhea, the foods you eat and your eating habits are very important. Choosing the right foods and drinks can help:  · Relieve diarrhea.  · Replace lost fluids and nutrients.  · Prevent dehydration.  What general guidelines should I follow?    Relieving diarrhea  · Choose foods with less than 2 g or .07 oz. of fiber per serving.  · Limit fats to less than 8 tsp (38 g or 1.34 oz.) a day.  · Avoid the following:  ? Foods and beverages sweetened with high-fructose corn syrup, honey, or sugar alcohols such as xylitol, sorbitol, and mannitol.  ? Foods that contain a lot of fat or sugar.  ? Fried, greasy, or spicy foods.  ? High-fiber grains, breads, and cereals.  ? Raw fruits and vegetables.  · Eat foods that are rich in probiotics. These foods include dairy products such as yogurt and fermented milk products. They help increase healthy bacteria in the stomach and intestines (gastrointestinal tract, or GI tract).  · If you have lactose intolerance, avoid dairy products. These may make your diarrhea worse.  · Take medicine to help stop diarrhea (antidiarrheal medicine) only as told by your health care provider.  Replacing nutrients  · Eat small meals or snacks every 3-4 hours.  · Eat bland foods, such as white rice, toast, or baked potato, until your diarrhea starts to get better. Gradually reintroduce nutrient-rich foods as tolerated or as told by your health care provider. This includes:  ? Well-cooked protein foods.  ? Peeled, seeded, and soft-cooked fruits and vegetables.  ? Low-fat dairy products.  · Take vitamin and mineral supplements as told by your health care provider.  Preventing dehydration  · Start by sipping water or a special solution to prevent dehydration (oral rehydration solution, ORS). Urine that is clear or pale yellow means that you are getting enough fluid.  · Try to drink at least 8-10 cups of fluid each day to help replace lost  fluids.  · You may add other liquids in addition to water, such as clear juice or decaffeinated sports drinks, as tolerated or as told by your health care provider.  · Avoid drinks with caffeine, such as coffee, tea, or soft drinks.  · Avoid alcohol.  What foods are recommended?         The items listed may not be a complete list. Talk with your health care provider about what dietary choices are best for you.  Grains  White rice. White, Arabic, or farhana breads (fresh or toasted), including plain rolls, buns, or bagels. White pasta. Saltine, soda, or mark crackers. Pretzels. Low-fiber cereal. Cooked cereals made with water (such as cornmeal, farina, or cream cereals). Plain muffins. Matzo. [place] toast. [place].  Vegetables  Potatoes (without the skin). Most well-cooked and canned vegetables without skins or seeds. Tender lettuce.  Fruits  Apple sauce. Fruits canned in juice. Cooked apricots, cherries, grapefruit, peaches, pears, or plums. Fresh bananas and cantaloupe.  Meats and other protein foods  Baked or boiled chicken. Eggs. Tofu. Fish. Seafood. Smooth nut butters. Ground or well-cooked tender beef, ham, veal, lamb, pork, or poultry.  Dairy  Plain yogurt, kefir, and unsweetened liquid yogurt. Lactose-free milk, buttermilk, skim milk, or soy milk. Low-fat or nonfat hard cheese.  Beverages  Water. Low-calorie sports drinks. Fruit juices without pulp. Strained tomato and vegetable juices. Decaffeinated teas. Sugar-free beverages not sweetened with sugar alcohols. Oral rehydration solutions, if approved by your health care provider.  Seasoning and other foods  Bouillon, broth, or soups made from recommended foods.  What foods are not recommended?  The items listed may not be a complete list. Talk with your health care provider about what dietary choices are best for you.  Grains  Whole grain, whole wheat, bran, or rye breads, rolls, pastas, and crackers. Wild or brown rice. Whole grain or bran cereals. Barley.  Oats and oatmeal. Corn tortillas or taco shells. Granola. Popcorn.  Vegetables  Raw vegetables. Fried vegetables. Cabbage, broccoli, [place] sprouts, artichokes, baked beans, beet greens, corn, kale, legumes, peas, sweet potatoes, and yams. Potato skins. Cooked spinach and cabbage.  Fruits  Dried fruit, including raisins and dates. Raw fruits. Stewed or dried prunes. Canned fruits with syrup.  Meat and other protein foods  Fried or fatty meats. Deli meats. [place] nut butters. Nuts and seeds. Beans and lentils. [place]. Hot dogs. Sausage.  Dairy  High-fat cheeses. Whole milk, chocolate milk, and beverages made with milk, such as milk shakes. Half-and-half. Cream. sour cream. Ice cream.  Beverages  Caffeinated beverages (such as coffee, tea, soda, or energy drinks). Alcoholic beverages. Fruit juices with pulp. Prune juice. Soft drinks sweetened with high-fructose corn syrup or sugar alcohols. High-calorie sports drinks.  Fats and oils  Butter. Cream sauces. Margarine. Salad oils. Plain salad dressings. Olives. Avocados. Mayonnaise.  Sweets and desserts  Sweet rolls, doughnuts, and sweet breads. Sugar-free desserts sweetened with sugar alcohols such as xylitol and sorbitol.  Seasoning and other foods  Honey. Hot sauce. Chili powder. Gravy. Cream-based or milk-based soups. Pancakes and waffles.  Summary  · When you have diarrhea, the foods you eat and your eating habits are very important.  · Make sure you get at least 8-10 cups of fluid each day, or enough to keep your urine clear or pale yellow.  · Eat bland foods and gradually reintroduce healthy, nutrient-rich foods as tolerated, or as told by your health care provider.  · Avoid high-fiber, fried, greasy, or spicy foods.  This information is not intended to replace advice given to you by your health care provider. Make sure you discuss any questions you have with your health care provider.  Document Released: 03/09/2005 Document Revised: 04/09/2020 Document Reviewed:  12/15/2017  [employer] Patient Education © 2020 [employer]  Diarrhea, Adult  Diarrhea is when you pass loose and watery poop (stool) often. Diarrhea can make you feel weak and cause you to lose water in your body (get dehydrated). Losing water in your body can cause you to:  · Feel tired and thirsty.  · Have a dry mouth.  · Go pee (urinate) less often.  Diarrhea often lasts 2-3 days. However, it can last longer if it is a sign of something more serious. It is important to treat your diarrhea as told by your doctor.  Follow these instructions at home:  Eating and drinking         Follow these instructions as told by your doctor:  · Take an ORS (oral rehydration solution). This is a drink that helps you replace fluids and minerals your body lost. It is sold at pharmacies and stores.  · Drink plenty of fluids, such as:  ? Water.  ? Ice chips.  ? Diluted fruit juice.  ? Low-calorie sports drinks.  ? Milk, if you want.  · Avoid drinking fluids that have a lot of sugar or caffeine in them.  · Eat bland, easy-to-digest foods in small amounts as you are able. These foods include:  ? Bananas.  ? Applesauce.  ? Rice.  ? Low-fat (lean) meats.  ? Toast.  ? Crackers.  · Avoid alcohol.  · Avoid spicy or fatty foods.    Medicines  · Take over-the-counter and prescription medicines only as told by your doctor.  · If you were prescribed an antibiotic medicine, take it as told by your doctor. Do not stop using the antibiotic even if you start to feel better.  General instructions    · Wash your hands often using soap and water. If soap and water are not available, use a hand . Others in your home should wash their hands as well. Hands should be washed:  ? After using the toilet or changing a diaper.  ? Before preparing, cooking, or serving food.  ? While caring for a sick person.  ? While visiting someone in a hospital.  · Drink enough fluid to keep your pee (urine) pale yellow.  · Rest at home while you get  better.  · Watch your condition for any changes.  · Take a warm bath to help with any burning or pain from having diarrhea.  · Keep all follow-up visits as told by your doctor. This is important.  Contact a doctor if:  · You have a fever.  · Your diarrhea gets worse.  · You have new symptoms.  · You cannot keep fluids down.  · You feel light-headed or dizzy.  · You have a headache.  · You have muscle cramps.  Get help right away if:  · You have chest pain.  · You feel very weak or you pass out (faint).  · You have bloody or black poop or poop that looks like tar.  · You have very bad pain, cramping, or bloating in your belly (abdomen).  · You have trouble breathing or you are breathing very quickly.  · Your heart is beating very quickly.  · Your skin feels cold and clammy.  · You feel confused.  · You have signs of losing too much water in your body, such as:  ? Dark pee, very little pee, or no pee.  ? Cracked lips.  ? Dry mouth.  ? Sunken eyes.  ? Sleepiness.  ? Weakness.  Summary  · Diarrhea is when you pass loose and watery poop (stool) often.  · Diarrhea can make you feel weak and cause you to lose water in your body (get dehydrated).  · Take an ORS (oral rehydration solution). This is a drink that is sold at pharmacies and stores.  · Eat bland, easy-to-digest foods in small amounts as you are able.  · Contact a doctor if your condition gets worse. Get help right away if you have signs that you have lost too much water in your body.  This information is not intended to replace advice given to you by your health care provider. Make sure you discuss any questions you have with your health care provider.  Document Released: 06/05/2009 Document Revised: 05/24/2019 Document Reviewed: 05/24/2019  [employer] Patient Education © 2020 [employer]    Viral Gastroenteritis, Adult    Viral gastroenteritis is also known as the stomach flu. This condition may affect your stomach, your small intestine, and your large  intestine. It can cause sudden watery poop (diarrhea), fever, and throwing up (vomiting). This condition is caused by certain germs (viruses). These germs can be passed from person to person very easily (are contagious).  Having watery poop and throwing up can make you feel weak and cause you to not have enough water in your body (get dehydrated). This can make you tired and thirsty, make you have a dry mouth, and make it so you pee (urinate) less often. It is important to replace the fluids that you lose from having watery poop and throwing up.  What are the causes?  You can get sick by catching viruses from other people.  You can also get sick by:  Eating food, drinking water, or touching a surface that has the viruses on it (is contaminated).  Sharing utensils or other personal items with a person who is sick.  What increases the risk?  Having a weak body defense system (immune system).  Living with one or more children who are younger than 2 years old.  Living in a nursing home.  Going on cruise ships.  What are the signs or symptoms?  Symptoms of this condition start suddenly. Symptoms may last for a few days or for as long as a week.  Common symptoms include:  Watery poop.  Throwing up.  Other symptoms include:  Fever.  Headache.  Feeling tired (fatigue).  Pain in the belly (abdomen).  Chills.  Feeling weak.  Feeling sick to your stomach (nauseous).  Muscle aches.  Not feeling hungry.  How is this treated?  This condition typically goes away on its own.  The focus of treatment is to replace the fluids that you lose. This condition may be treated with:  An ORS (oral rehydration solution). This is a drink that is sold at pharmacies and stores.  Medicines to help with your symptoms.  Probiotic supplements to reduce symptoms of diarrhea.  Fluids given through an IV tube, if needed.  Older adults and people with other diseases or a weak body defense system are at higher risk for not having enough water in the  body.  Follow these instructions at home:  Eating and drinking    Take an ORS as told by your doctor.  Drink clear fluids in small amounts as you are able. Clear fluids include:  Water.  Ice chips.  Fruit juice with water added to it (diluted).  Low-calorie sports drinks.  Drink enough fluid to keep your pee (urine) pale yellow.  Eat small amounts of healthy foods every 3-4 hours as you are able. This may include whole grains, fruits, vegetables, lean meats, and yogurt.  Avoid fluids that have a lot of sugar or caffeine in them, such as energy drinks, sports drinks, and soda.  Avoid spicy or fatty foods.  Avoid alcohol.  General instructions    Wash your hands often. This is very important after you have watery poop or you throw up. If you cannot use soap and water, use hand .  Make sure that all people in your home wash their hands well and often.  Take over-the-counter and prescription medicines only as told by your doctor.  Rest at home while you get better.  Watch your condition for any changes.  Take a warm bath to help with any burning or pain from having watery poop.  Keep all follow-up visits as told by your doctor. This is important.  Contact a doctor if:  You cannot keep fluids down.  Your symptoms get worse.  You have new symptoms.  You feel light-headed.  You feel dizzy.  You have muscle cramps.  Get help right away if:  You have chest pain.  You feel very weak.  You pass out (faint).  You see blood in your throw-up.  Your throw-up looks like coffee grounds.  You have bloody or black poop (stools) or poop that looks like tar.  You have a very bad headache, or a stiff neck, or both.  You have a rash.  You have very bad pain, cramping, or bloating in your belly.  You have trouble breathing.  You are breathing very quickly.  You have a fast heartbeat.  Your skin feels cold and clammy.  You feel mixed up (confused).  You have pain when you pee.  You have signs of not having enough water in the body,  such as:  Dark pee, hardly any pee, or no pee.  Cracked lips.  Dry mouth.  Sunken eyes.  Feeling very sleepy.  Feeling weak.  Summary  Viral gastroenteritis is also known as the stomach flu.  This condition can cause sudden watery poop (diarrhea), fever, and throwing up (vomiting).  These germs can be passed from person to person very easily.  Take an ORS as told by your doctor. This is a drink that is sold at pharmacies and stores.  Drink fluids in small amounts many times each day as you are able.  This information is not intended to replace advice given to you by your health care provider. Make sure you discuss any questions you have with your health care provider.  Document Released: 06/05/2009 Document Revised: 10/23/2019 Document Reviewed: 10/23/2019  [employer] Patient Education © 2020 [employer]

## 2021-09-14 NOTE — PROGRESS NOTES
Chief Complaint   Patient presents with   • Diarrhea     x 2 weeks        Subjective:     HPI:     Елена Perla is a 86 y.o. female here to discuss the evaluation and management of:    Presents today with having diarrhea for the last two weeks.  Patient denies nausea, vomiting, diarrhea, fever or chills.  Denies any hematochezia, denies any dysuria, abdominal pain.  Denies any other sick contacts at home, no new foods, medications.  States she is tried some over-the-counter Kaopectate without any significant improvement.  She does mention that her symptoms have improved since 2 weeks ago and she is having more solid stools.  She is frustrated at the length of time this is taken.  Of note she has lost 6 pounds since January.      Patient would like to update her blood work for her chronic conditions.    Essential hypertension  Currently taking lisinopril, HCTZ, metoprolol and amlodipine for this.Denies any chest pain, shortness of breath, leg swelling, jaw claudication, lightheadedness, dizziness. Last GFR >60.    Dyslipidemia  Not on statin therapy.  Due for routine monitoring this November for her lipid panel.    Pre-diabetes  Previous A1c at 5.7%.  Denies symptoms of hyperglycemia.    Mentions that she recently had Epidural for her back pain in mid August. States she went dancing and triggered her back pain. She does not want any surgery.     ROS:  Denies any Headache, Blurred Vision, Confusion, Chest pain,  Shortness of breath,  Abdominal pain, Changes of bowel or bladder, Lower ext edema, Fevers, Nights sweats, Weight Changes, Focal weakness or numbness.  And all other systems reviewed and are all negative. POSITIVE FOR : see above        Current Outpatient Medications:   •  amoxicillin (AMOXIL) 500 MG Cap, Take 1 Capsule by mouth 2 times a day., Disp: 20 Capsule, Rfl: 0  •  potassium chloride ER (KLOR-CON 10) 10 MEQ tablet, Take 2 Tablets by mouth 3 times a day., Disp: 180 tablet, Rfl: 3  •  metoprolol  tartrate (LOPRESSOR) 50 MG Tab, Take 1 tablet by mouth 2 times a day., Disp: 60 tablet, Rfl: 11  •  lisinopril (PRINIVIL) 40 MG tablet, Take 1 tablet by mouth every day., Disp: 100 tablet, Rfl: 2  •  hydroCHLOROthiazide (HYDRODIURIL) 25 MG Tab, Take 1 tablet by mouth every day., Disp: 30 tablet, Rfl: 11  •  amLODIPine (NORVASC) 5 MG Tab, Take 1 tablet by mouth every day., Disp: 30 tablet, Rfl: 11  •  propranolol (INDERAL) 20 MG Tab, Take 1 Tab by mouth every day., Disp: 100 Tab, Rfl: 2  •  Multiple Vitamins-Minerals (PRESERVISION AREDS PO), Take  by mouth., Disp: , Rfl:   •  Potassium 99 MG Tab, Take 1 Tab by mouth. 4 tabs a day, Disp: , Rfl:   •  VITAMIN A PO, Take  by mouth., Disp: , Rfl:   •  therapeutic multivitamin-minerals (THERAGRAN-M) TABS, Take 1 Tab by mouth every day., Disp: , Rfl:   •  Calcium Carb-Cholecalciferol (CALCIUM + D3) 600-200 MG-UNIT TABS, Take  by mouth. 5 daily, Disp: , Rfl:   •  Magnesium 250 MG TABS, Take  by mouth every day., Disp: , Rfl:   •  Cyanocobalamin (VITAMIN B-12) 5000 MCG SUBL, Place  under tongue every day., Disp: , Rfl:   •  Garlic 1000 MG CAPS, Take  by mouth every day., Disp: , Rfl:   •  Cholecalciferol (VITAMIN D3) 5000 UNITS CAPS, Take 1 Cap by mouth every day., Disp: , Rfl:   •  Omega-3 Fatty Acids (FISH OIL) 1200 MG CAPS, Take  by mouth. 5 caps daily, Disp: , Rfl:   •  ketorolac (ACULAR) 0.5 % Solution, , Disp: , Rfl:     Allergies   Allergen Reactions   • Percodan  [Kdc:Yellow Dye+Aspirin+Oxycodone] Vomiting   • Percodan-Varsha  [Oxycodone-Aspirin] Vomiting     Other reaction(s): Dizziness       Past Medical History:   Diagnosis Date   • Back pain    • Hypertension    • Leukopenia 12/14/2018   • Neutropenia (HCC) 12/14/2018   • Unspecified cataract     bilateral removal      Past Surgical History:   Procedure Laterality Date   • BLOCK EPIDURAL STEROID INJECTION Right 1/28/2021    Procedure: INJECTION, STEROID, EPIDURAL;  Surgeon: CHRISSY Jones M.D.;  Location[facility];  Service: Pain Management   • CATARACT PHACO WITH IOL  9/10/2014    Performed by Thanh Lloyd M.D. at SURGERY SAME DAY [facility] ORS   • CATARACT PHACO WITH IOL  8/27/2014    [facility]   • CHOLECYSTECTOMY  12/4/2008    lap arturo   • HYSTERECTOMY, TOTAL ABDOMINAL       Family History   Problem Relation Age of Onset   • Diabetes Neg Hx    • Hyperlipidemia Neg Hx    • Hypertension Neg Hx      Social History     Socioeconomic History   • Marital status: Single     Spouse name: Not on file   • Number of children: Not on file   • Years of education: Not on file   • Highest education level: Not on file   Occupational History     Employer: OTHER   Tobacco Use   • Smoking status: Never Smoker   • Smokeless tobacco: Never Used   Vaping Use   • Vaping Use: Never used   Substance and Sexual Activity   • Alcohol use: No   • Drug use: No   • Sexual activity: Not Currently   Other Topics Concern   • Not on file   Social History Narrative   • Not on file     Social Determinants of Health     Financial Resource Strain:    • Difficulty of Paying Living Expenses:    Food Insecurity:    • Worried About Running Out of Food in the Last Year:    • Ran Out of Food in the Last Year:    Transportation Needs:    • Lack of Transportation (Medical):    • Lack of Transportation (Non-Medical):    Physical Activity:    • Days of Exercise per Week:    • Minutes of Exercise per Session:    Stress:    • Feeling of Stress :    Social Connections:    • Frequency of Communication with Friends and Family:    • Frequency of Social Gatherings with Friends and Family:    • Attends Congregation Services:    • Active Member of Clubs or Organizations:    • Attends Club or Organization Meetings:    • Marital Status:    Intimate Partner Violence:    • Fear of Current or Ex-Partner:    • Emotionally Abused:    • Physically Abused:    • Sexually Abused:        Objective:     Vitals:  "/76 (BP Location: Right arm, Patient Position: Sitting, BP Cuff Size: Adult)   Pulse 75   Temp 36.9 °C (98.5 °F) (Temporal)   Resp 16   Ht 1.651 m (5' 5\")   Wt 59 kg (130 lb)   SpO2 96%   BMI 21.63 kg/m²    General: Alert, pleasant, NAD  HEENT: Normocephalic.  Neck supple.   Respiratory: no distress, no audible wheezing, RR -WNL  Skin: Warm, dry, no rashes.  Extremities: No leg edema. No discoloration  Neurological: No tremors  Psych:  Affect/mood is normal, judgement is good, memory is intact, grooming is appropriate.    Assessment/Plan:     Елена was seen today for diarrhea.    Diagnoses and all orders for this visit:    Diarrhea, unspecified type  Acute, self-limiting problem.  Vital signs are stable, no red flags.  Suspect viral etiology.  Have discussed worsening symptoms and emergent precautions.  Recommend toast, rice, cooked carrots, adequate hydration and avoid greasy, heavy or rich dense meals at this time.  Suspect her symptoms will improve in the next 2 weeks.  If not she will follow back up to review.    -     Comp Metabolic Panel; Future    Essential hypertension  Stable. Will continue medication regimen. Discussed heart healthy diet. Encouraged to avoid high sodium foods.  -     MICROALBUMIN CREAT RATIO URINE; Future  -     Comp Metabolic Panel; Future  -     CBC WITH DIFFERENTIAL; Future  -     TSH WITH REFLEX TO FT4; Future    Dyslipidemia  Chronic.  Managed with lifestyle modifications.  -     Lipid Profile; Future    Pre-diabetes  Due for recheck.  -     HEMOGLOBIN A1C; Future    Other orders  Requested to have on hand as she has 2 years ago for symptoms of acute cystitis.  -     amoxicillin (AMOXIL) 500 MG Cap; Take 1 Capsule by mouth 2 times a day.      Return in about 3 months (around 12/14/2021), or if symptoms worsen or fail to improve.        Miya CASTAÑEDA  "

## 2021-09-29 ENCOUNTER — HOSPITAL ENCOUNTER (OUTPATIENT)
Dept: LAB | Facility: MEDICAL CENTER | Age: 86
End: 2021-09-29
Attending: NURSE PRACTITIONER
Payer: MEDICARE

## 2021-09-29 DIAGNOSIS — R19.7 DIARRHEA, UNSPECIFIED TYPE: ICD-10-CM

## 2021-09-29 DIAGNOSIS — I10 ESSENTIAL HYPERTENSION: ICD-10-CM

## 2021-09-29 DIAGNOSIS — E78.5 DYSLIPIDEMIA: ICD-10-CM

## 2021-09-29 DIAGNOSIS — R73.03 PRE-DIABETES: ICD-10-CM

## 2021-09-29 LAB
ALBUMIN SERPL BCP-MCNC: 4.2 G/DL (ref 3.2–4.9)
ALBUMIN/GLOB SERPL: 1.4 G/DL
ALP SERPL-CCNC: 74 U/L (ref 30–99)
ALT SERPL-CCNC: 27 U/L (ref 2–50)
ANION GAP SERPL CALC-SCNC: 11 MMOL/L (ref 7–16)
AST SERPL-CCNC: 22 U/L (ref 12–45)
BASOPHILS # BLD AUTO: 0.7 % (ref 0–1.8)
BASOPHILS # BLD: 0.04 K/UL (ref 0–0.12)
BILIRUB SERPL-MCNC: 0.4 MG/DL (ref 0.1–1.5)
BUN SERPL-MCNC: 17 MG/DL (ref 8–22)
CALCIUM SERPL-MCNC: 9.9 MG/DL (ref 8.5–10.5)
CHLORIDE SERPL-SCNC: 101 MMOL/L (ref 96–112)
CHOLEST SERPL-MCNC: 188 MG/DL (ref 100–199)
CO2 SERPL-SCNC: 28 MMOL/L (ref 20–33)
CREAT SERPL-MCNC: 0.64 MG/DL (ref 0.5–1.4)
CREAT UR-MCNC: 70.73 MG/DL
EOSINOPHIL # BLD AUTO: 0.16 K/UL (ref 0–0.51)
EOSINOPHIL NFR BLD: 2.7 % (ref 0–6.9)
ERYTHROCYTE [DISTWIDTH] IN BLOOD BY AUTOMATED COUNT: 42.7 FL (ref 35.9–50)
EST. AVERAGE GLUCOSE BLD GHB EST-MCNC: 108 MG/DL
FASTING STATUS PATIENT QL REPORTED: NORMAL
GLOBULIN SER CALC-MCNC: 3 G/DL (ref 1.9–3.5)
GLUCOSE SERPL-MCNC: 92 MG/DL (ref 65–99)
HBA1C MFR BLD: 5.4 % (ref 4–5.6)
HCT VFR BLD AUTO: 39.5 % (ref 37–47)
HDLC SERPL-MCNC: 51 MG/DL
HGB BLD-MCNC: 13.5 G/DL (ref 12–16)
IMM GRANULOCYTES # BLD AUTO: 0.03 K/UL (ref 0–0.11)
IMM GRANULOCYTES NFR BLD AUTO: 0.5 % (ref 0–0.9)
LDLC SERPL CALC-MCNC: 94 MG/DL
LYMPHOCYTES # BLD AUTO: 1.48 K/UL (ref 1–4.8)
LYMPHOCYTES NFR BLD: 25.2 % (ref 22–41)
MCH RBC QN AUTO: 33.1 PG (ref 27–33)
MCHC RBC AUTO-ENTMCNC: 34.2 G/DL (ref 33.6–35)
MCV RBC AUTO: 96.8 FL (ref 81.4–97.8)
MICROALBUMIN UR-MCNC: <1.2 MG/DL
MICROALBUMIN/CREAT UR: NORMAL MG/G (ref 0–30)
MONOCYTES # BLD AUTO: 0.65 K/UL (ref 0–0.85)
MONOCYTES NFR BLD AUTO: 11.1 % (ref 0–13.4)
NEUTROPHILS # BLD AUTO: 3.52 K/UL (ref 2–7.15)
NEUTROPHILS NFR BLD: 59.8 % (ref 44–72)
NRBC # BLD AUTO: 0 K/UL
NRBC BLD-RTO: 0 /100 WBC
PLATELET # BLD AUTO: 219 K/UL (ref 164–446)
PMV BLD AUTO: 10.4 FL (ref 9–12.9)
POTASSIUM SERPL-SCNC: 4 MMOL/L (ref 3.6–5.5)
PROT SERPL-MCNC: 7.2 G/DL (ref 6–8.2)
RBC # BLD AUTO: 4.08 M/UL (ref 4.2–5.4)
SODIUM SERPL-SCNC: 140 MMOL/L (ref 135–145)
TRIGL SERPL-MCNC: 214 MG/DL (ref 0–149)
TSH SERPL DL<=0.005 MIU/L-ACNC: 2.22 UIU/ML (ref 0.38–5.33)
WBC # BLD AUTO: 5.9 K/UL (ref 4.8–10.8)

## 2021-09-29 PROCEDURE — 82570 ASSAY OF URINE CREATININE: CPT

## 2021-09-29 PROCEDURE — 85025 COMPLETE CBC W/AUTO DIFF WBC: CPT

## 2021-09-29 PROCEDURE — 80053 COMPREHEN METABOLIC PANEL: CPT

## 2021-09-29 PROCEDURE — 82043 UR ALBUMIN QUANTITATIVE: CPT

## 2021-09-29 PROCEDURE — 36415 COLL VENOUS BLD VENIPUNCTURE: CPT

## 2021-09-29 PROCEDURE — 83036 HEMOGLOBIN GLYCOSYLATED A1C: CPT

## 2021-09-29 PROCEDURE — 84443 ASSAY THYROID STIM HORMONE: CPT

## 2021-09-29 PROCEDURE — 80061 LIPID PANEL: CPT

## 2021-10-11 DIAGNOSIS — E87.6 HYPOKALEMIA: Chronic | ICD-10-CM

## 2021-10-12 RX ORDER — POTASSIUM CHLORIDE 750 MG/1
20 TABLET, FILM COATED, EXTENDED RELEASE ORAL 3 TIMES DAILY
Qty: 180 TABLET | Refills: 3 | Status: SHIPPED | OUTPATIENT
Start: 2021-10-12 | End: 2021-12-16 | Stop reason: SDUPTHER

## 2021-12-10 DIAGNOSIS — I10 ESSENTIAL HYPERTENSION: Chronic | ICD-10-CM

## 2021-12-12 RX ORDER — LISINOPRIL 40 MG/1
40 TABLET ORAL DAILY
Qty: 100 TABLET | Refills: 2 | Status: SHIPPED
Start: 2021-12-12 | End: 2021-12-16 | Stop reason: CLARIF

## 2021-12-14 ENCOUNTER — TELEPHONE (OUTPATIENT)
Dept: MEDICAL GROUP | Facility: MEDICAL CENTER | Age: 86
End: 2021-12-14

## 2021-12-14 NOTE — TELEPHONE ENCOUNTER
ESTABLISHED PATIENT PRE-VISIT PLANNING     Patient was NOT contacted to complete PVP.     Note: Patient will not be contacted if there is no indication to call.     1.  Reviewed notes from the last few office visits within the medical group: Yes    2.  If any orders were placed at last visit or intended to be done for this visit (i.e. 6 mos follow-up), do we have Results/Consult Notes?         •  Labs - Labs ordered, completed on 09/29/2021 and results are in chart.  Note: If patient appointment is for lab review and patient did not complete labs, check with provider if OK to reschedule patient until labs completed.         •  Imaging - Imaging was not ordered at last office visit. Last office visit with Provider Inga was on 09/14/2021.         •  Referrals - No referrals were ordered at last office visit.    3. Is this appointment scheduled as a Hospital Follow-Up? No    4.  Immunizations were updated in Epic using Reconcile Outside Information activity? Yes    5.  Patient is due for the following Health Maintenance Topics:   Health Maintenance Due   Topic Date Due   • COVID-19 Vaccine (1) Never done   • IMM ZOSTER VACCINES (1 of 2) Never done   • MAMMOGRAM  07/14/2021       6.  AHA (Pulse8) form printed for Provider? No, patient does not have any open alerts

## 2021-12-16 ENCOUNTER — OFFICE VISIT (OUTPATIENT)
Dept: MEDICAL GROUP | Facility: MEDICAL CENTER | Age: 86
End: 2021-12-16
Payer: MEDICARE

## 2021-12-16 VITALS
HEIGHT: 65 IN | BODY MASS INDEX: 21.83 KG/M2 | SYSTOLIC BLOOD PRESSURE: 142 MMHG | DIASTOLIC BLOOD PRESSURE: 60 MMHG | WEIGHT: 131 LBS | OXYGEN SATURATION: 95 % | TEMPERATURE: 97 F | HEART RATE: 70 BPM

## 2021-12-16 DIAGNOSIS — E78.5 DYSLIPIDEMIA: ICD-10-CM

## 2021-12-16 DIAGNOSIS — R73.03 PRE-DIABETES: ICD-10-CM

## 2021-12-16 DIAGNOSIS — G25.0 ESSENTIAL TREMOR: ICD-10-CM

## 2021-12-16 DIAGNOSIS — E87.6 HYPOKALEMIA: Chronic | ICD-10-CM

## 2021-12-16 DIAGNOSIS — I10 ESSENTIAL HYPERTENSION: Chronic | ICD-10-CM

## 2021-12-16 DIAGNOSIS — R19.7 DIARRHEA, UNSPECIFIED TYPE: ICD-10-CM

## 2021-12-16 PROCEDURE — 99213 OFFICE O/P EST LOW 20 MIN: CPT | Performed by: NURSE PRACTITIONER

## 2021-12-16 RX ORDER — AMLODIPINE BESYLATE 5 MG/1
5 TABLET ORAL DAILY
Qty: 30 TABLET | Refills: 11 | Status: SHIPPED | OUTPATIENT
Start: 2021-12-16 | End: 2022-08-29

## 2021-12-16 RX ORDER — METOPROLOL TARTRATE 50 MG/1
50 TABLET, FILM COATED ORAL 2 TIMES DAILY
Qty: 60 TABLET | Refills: 11 | Status: SHIPPED | OUTPATIENT
Start: 2021-12-16 | End: 2022-08-29

## 2021-12-16 RX ORDER — POTASSIUM CHLORIDE 750 MG/1
20 TABLET, FILM COATED, EXTENDED RELEASE ORAL 3 TIMES DAILY
Qty: 180 TABLET | Refills: 11 | Status: SHIPPED | OUTPATIENT
Start: 2021-12-16 | End: 2022-01-15

## 2021-12-16 RX ORDER — LISINOPRIL 40 MG/1
40 TABLET ORAL DAILY
Qty: 30 TABLET | Refills: 11 | Status: SHIPPED | OUTPATIENT
Start: 2021-12-16 | End: 2022-08-29

## 2021-12-16 RX ORDER — HYDROCHLOROTHIAZIDE 25 MG/1
25 TABLET ORAL DAILY
Qty: 30 TABLET | Refills: 11 | Status: SHIPPED | OUTPATIENT
Start: 2021-12-16 | End: 2022-08-29

## 2021-12-16 ASSESSMENT — FIBROSIS 4 INDEX: FIB4 SCORE: 1.66

## 2021-12-16 NOTE — PROGRESS NOTES
Chief Complaint   Patient presents with   • Hypertension Follow-up     3 MTH Fv       Subjective:     HPI:     Елена Perla is a 86 y.o. female   here to discuss the evaluation and management of:    To review labs and follow-up from last visit.    Diarrhea, unspecified type  Since last visit patient reports that this has resolved. Feeling better.      Essential hypertension  Currently taking lisinopril 40 mg, metoprolol 50 mg twice daily, HCTZ 25 mg, amlodipine 5 mg daily no CP, worsening FLORES, leg swelling, headache, dizziness.  No microalbuminuria present on recent labs.  GFR above 60.  TSH within normal.  She is requesting her prescriptions be sent as a 30-day supply with refills.    Dyslipidemia  Managed with lifestyle modifications.  Last lipid panel LDL below 100.     Pre-diabetes  Most recent A1c was 5.4%, previous 5.7%.    Tremors  Does not feel the Propranolol is helping her as much.     She is pleased that she finally was able to pain to her house. She is very active.     ROS:  Denies any Headache, Blurred Vision, Confusion, Chest pain,  Shortness of breath,  Abdominal pain, Changes of bowel or bladder, Lower ext edema, Fevers, Nights sweats, Weight Changes, Focal weakness or numbness.  And all other systems reviewed and are all negative. POSITIVE FOR : see above        Current Outpatient Medications:   •  amLODIPine (NORVASC) 5 MG Tab, Take 1 Tablet by mouth every day., Disp: 30 Tablet, Rfl: 11  •  hydroCHLOROthiazide (HYDRODIURIL) 25 MG Tab, Take 1 Tablet by mouth every day., Disp: 30 Tablet, Rfl: 11  •  metoprolol tartrate (LOPRESSOR) 50 MG Tab, Take 1 Tablet by mouth 2 times a day., Disp: 60 Tablet, Rfl: 11  •  potassium chloride ER (KLOR-CON 10) 10 MEQ tablet, Take 2 Tablets by mouth 3 times a day for 30 days., Disp: 180 Tablet, Rfl: 11  •  lisinopril (PRINIVIL) 40 MG tablet, Take 1 Tablet by mouth every day., Disp: 30 Tablet, Rfl: 11  •  ketorolac (ACULAR) 0.5 % Solution, , Disp: , Rfl:   •   amoxicillin (AMOXIL) 500 MG Cap, Take 1 Capsule by mouth 2 times a day., Disp: 20 Capsule, Rfl: 0  •  Multiple Vitamins-Minerals (PRESERVISION AREDS PO), Take  by mouth., Disp: , Rfl:   •  Potassium 99 MG Tab, Take 1 Tab by mouth. 4 tabs a day, Disp: , Rfl:   •  VITAMIN A PO, Take  by mouth., Disp: , Rfl:   •  therapeutic multivitamin-minerals (THERAGRAN-M) TABS, Take 1 Tab by mouth every day., Disp: , Rfl:   •  Calcium Carb-Cholecalciferol (CALCIUM + D3) 600-200 MG-UNIT TABS, Take  by mouth. 5 daily, Disp: , Rfl:   •  Magnesium 250 MG TABS, Take  by mouth every day., Disp: , Rfl:   •  Cyanocobalamin (VITAMIN B-12) 5000 MCG SUBL, Place  under tongue every day., Disp: , Rfl:   •  Garlic 1000 MG CAPS, Take  by mouth every day., Disp: , Rfl:   •  Cholecalciferol (VITAMIN D3) 5000 UNITS CAPS, Take 1 Cap by mouth every day., Disp: , Rfl:   •  Omega-3 Fatty Acids (FISH OIL) 1200 MG CAPS, Take  by mouth. 5 caps daily, Disp: , Rfl:     Allergies   Allergen Reactions   • Percodan  [Kdc:Yellow Dye+Aspirin+Oxycodone] Vomiting   • Percodan-Varsha  [Oxycodone-Aspirin] Vomiting     Other reaction(s): Dizziness       Past Medical History:   Diagnosis Date   • Back pain    • Hypertension    • Leukopenia 12/14/2018   • Neutropenia (HCC) 12/14/2018   • Unspecified cataract     bilateral removal      Past Surgical History:   Procedure Laterality Date   • BLOCK EPIDURAL STEROID INJECTION Right 1/28/2021    Procedure: INJECTION, STEROID, EPIDURAL;  Surgeon: CHRISSY Jones M.D.;  [facility];  Service: Pain Management   • CATARACT PHACO WITH IOL  9/10/2014    Performed by Thanh Lloyd M.D. at SURGERY SAME DAY [facility] ORS   • CATARACT PHACO WITH IOL  8/27/2014    [facility]h [facility]   • CHOLECYSTECTOMY  12/4/2008    lap arturo   • HYSTERECTOMY, TOTAL ABDOMINAL       Family History   Problem Relation Age of Onset   • Diabetes Neg Hx    • Hyperlipidemia Neg Hx     • Hypertension Neg Hx      Social History     Socioeconomic History   • Marital status: Single     Spouse name: Not on file   • Number of children: Not on file   • Years of education: Not on file   • Highest education level: Not on file   Occupational History     Employer: OTHER   Tobacco Use   • Smoking status: Never Smoker   • Smokeless tobacco: Never Used   Vaping Use   • Vaping Use: Never used   Substance and Sexual Activity   • Alcohol use: No   • Drug use: No   • Sexual activity: Not Currently   Other Topics Concern   • Not on file   Social History Narrative   • Not on file     Social Determinants of Health     Financial Resource Strain:    • Difficulty of Paying Living Expenses: Not on file   Food Insecurity:    • Worried About Running Out of Food in the Last Year: Not on file   • Ran Out of Food in the Last Year: Not on file   Transportation Needs:    • Lack of Transportation (Medical): Not on file   • Lack of Transportation (Non-Medical): Not on file   Physical Activity:    • Days of Exercise per Week: Not on file   • Minutes of Exercise per Session: Not on file   Stress:    • Feeling of Stress : Not on file   Social Connections:    • Frequency of Communication with Friends and Family: Not on file   • Frequency of Social Gatherings with Friends and Family: Not on file   • Attends Shinto Services: Not on file   • Active Member of Clubs or Organizations: Not on file   • Attends Club or Organization Meetings: Not on file   • Marital Status: Not on file   Intimate Partner Violence:    • Fear of Current or Ex-Partner: Not on file   • Emotionally Abused: Not on file   • Physically Abused: Not on file   • Sexually Abused: Not on file   Housing Stability:    • Unable to Pay for Housing in the Last Year: Not on file   • Number of Places Lived in the Last Year: Not on file   • Unstable Housing in the Last Year: Not on file       Objective:     Vitals: /60 (BP Location: Right arm, Patient Position:  "Sitting, BP Cuff Size: Adult)   Pulse 70   Temp 36.1 °C (97 °F) (Temporal)   Ht 1.651 m (5' 5\")   Wt 59.4 kg (131 lb)   SpO2 95%   BMI 21.80 kg/m²    General: Alert, pleasant, NAD  HEENT: Normocephalic.  Neck supple.   Respiratory: no distress, no audible wheezing, RR -WNL  Skin: Warm, dry, no rashes.  Extremities: No leg edema. No discoloration  Neurological: No tremors  Psych:  Affect/mood is normal, judgement is good, memory is intact, grooming is appropriate.    Assessment/Plan:     Елена was seen today for hypertension follow-up.    Diagnoses and all orders for this visit:    Diarrhea, unspecified type  Resolved.    Essential hypertension  Stable. Will continue medication regimen. Discussed heart healthy diet. Encouraged to avoid high sodium foods.  -     amLODIPine (NORVASC) 5 MG Tab; Take 1 Tablet by mouth every day.  -     hydroCHLOROthiazide (HYDRODIURIL) 25 MG Tab; Take 1 Tablet by mouth every day.  -     metoprolol tartrate (LOPRESSOR) 50 MG Tab; Take 1 Tablet by mouth 2 times a day.  -     lisinopril (PRINIVIL) 40 MG tablet; Take 1 Tablet by mouth every day.    Hypokalemia  Stable.  Continue supplementation.  Refills provided.  -     potassium chloride ER (KLOR-CON 10) 10 MEQ tablet; Take 2 Tablets by mouth 3 times a day for 30 days.    Dyslipidemia  Not on statin therapy, LDL below 100.  Continue lifestyle modifications.    Pre-diabetes  Stable.  A1c 5.4%.  Continue to routinely monitor.    Essential tremor  Stop propranolol.  Follow-up 3 months to reassess.      Return in about 3 months (around 3/16/2022).        Miya CASTAÑEDA"

## 2022-01-05 RX ORDER — PROPRANOLOL HYDROCHLORIDE 20 MG/1
20 TABLET ORAL DAILY
Qty: 100 TABLET | Refills: 2 | OUTPATIENT
Start: 2022-01-05

## 2022-01-19 ENCOUNTER — TELEPHONE (OUTPATIENT)
Dept: MEDICAL GROUP | Facility: MEDICAL CENTER | Age: 87
End: 2022-01-19

## 2022-01-19 NOTE — TELEPHONE ENCOUNTER
Phone Number Called: [phone]    Call outcome: Left detailed message for patient. Informed to call back with any additional questions.    Message: Patient LVM wondering why she is unable to get her propranolol. Called to inform patient that her Propranolol was discontinued at her last appointment due to her mentioning that it was not helpful to her. Per plan of care, Miya discontinued the medication and the patient is to return in 3 months to reassess. If patient is wanting to continue the medication then she can let us know, but that is the reasoning as to why she cannot refill her prescription. LVM stating all of this information as patient's full name was on the voicemail. This RN also stated that if she has any questions or would like to discuss this she can call us back.

## 2022-03-08 ENCOUNTER — TELEPHONE (OUTPATIENT)
Dept: HEALTH INFORMATION MANAGEMENT | Facility: OTHER | Age: 87
End: 2022-03-08
Payer: MEDICARE

## 2022-03-15 ENCOUNTER — TELEPHONE (OUTPATIENT)
Dept: MEDICAL GROUP | Facility: MEDICAL CENTER | Age: 87
End: 2022-03-15
Payer: MEDICARE

## 2022-03-15 NOTE — TELEPHONE ENCOUNTER
ESTABLISHED PATIENT PRE-VISIT PLANNING     Annual Wellness Visit Over Due    Patient was NOT contacted to complete PVP.     Note: Patient will not be contacted if there is no indication to call.     1.  Reviewed notes from the last few office visits within the medical group: Yes    2.  If any orders were placed at last visit or intended to be done for this visit (i.e. 6 mos follow-up), do we have Results/Consult Notes?         •  Labs - Labs were not ordered at last office visit.   Last office visit with Provider Inga was on 12/16/2021.  Note: If patient appointment is for lab review and patient did not complete labs, check with provider if OK to reschedule patient until labs completed.       •  Imaging - Imaging was not ordered at last office visit.       •  Referrals - No referrals were ordered at last office visit.    3. Is this appointment scheduled as a Hospital Follow-Up? No    4.  Immunizations were updated in Epic using Reconcile Outside Information activity? Yes    5.  Patient is due for the following Health Maintenance Topics:   Health Maintenance Due   Topic Date Due   • COVID-19 Vaccine (1) Never done   • MAMMOGRAM  07/14/2021   • Annual Wellness Visit  02/02/2022       6.  AHA (Pulse8) form printed for Provider? Yes

## 2022-03-16 ENCOUNTER — OFFICE VISIT (OUTPATIENT)
Dept: MEDICAL GROUP | Facility: MEDICAL CENTER | Age: 87
End: 2022-03-16
Payer: MEDICARE

## 2022-03-16 VITALS
BODY MASS INDEX: 22.16 KG/M2 | OXYGEN SATURATION: 97 % | HEIGHT: 65 IN | TEMPERATURE: 97.3 F | HEART RATE: 68 BPM | SYSTOLIC BLOOD PRESSURE: 154 MMHG | WEIGHT: 133 LBS | DIASTOLIC BLOOD PRESSURE: 58 MMHG

## 2022-03-16 DIAGNOSIS — F43.22 ADJUSTMENT DISORDER WITH ANXIOUS MOOD: ICD-10-CM

## 2022-03-16 DIAGNOSIS — G25.0 ESSENTIAL TREMOR: ICD-10-CM

## 2022-03-16 DIAGNOSIS — G47.00 INSOMNIA, UNSPECIFIED TYPE: ICD-10-CM

## 2022-03-16 DIAGNOSIS — H35.3230 BILATERAL EXUDATIVE AGE-RELATED MACULAR DEGENERATION, UNSPECIFIED STAGE (HCC): ICD-10-CM

## 2022-03-16 PROBLEM — H35.30 MACULAR DEGENERATION OF BOTH EYES: Status: ACTIVE | Noted: 2022-03-16

## 2022-03-16 PROCEDURE — 99214 OFFICE O/P EST MOD 30 MIN: CPT | Performed by: NURSE PRACTITIONER

## 2022-03-16 RX ORDER — PROPRANOLOL HYDROCHLORIDE 20 MG/1
20 TABLET ORAL DAILY
Qty: 30 TABLET | Refills: 6 | Status: SHIPPED | OUTPATIENT
Start: 2022-03-16 | End: 2022-08-29

## 2022-03-16 RX ORDER — TRAZODONE HYDROCHLORIDE 50 MG/1
50 TABLET ORAL NIGHTLY PRN
Qty: 30 TABLET | Refills: 6 | Status: SHIPPED
Start: 2022-03-16 | End: 2022-05-04

## 2022-03-16 ASSESSMENT — PATIENT HEALTH QUESTIONNAIRE - PHQ9: CLINICAL INTERPRETATION OF PHQ2 SCORE: 0

## 2022-03-16 ASSESSMENT — FIBROSIS 4 INDEX: FIB4 SCORE: 1.66

## 2022-03-16 NOTE — PROGRESS NOTES
Chief Complaint   Patient presents with   • Tremors     3 MTH Fv     Subjective:     HPI:     Елена Perla is a 86 y.o. female   here to discuss the evaluation and management of:       1. Adjustment disorder with anxious mood  She states since her last visit since stopping the propranolol she feels quite nerved up.  She has too many things on her mind.  She would like to restart this.    2. Essential tremor  Since her last visit she tells me that she would like to start back on the propranolol.  States she did notice a difference in her tremors.  States again they are not daily however might be more exacerbated with lack of sleep as well as her anxiety.    3. Insomnia, unspecified type  Tells me that she does not sleep very well.  Sometimes she will sleep a few hours and then get up.  States she has to many things on her mind. Would like to trial an OTC (Relaxium) which she saw on a commercial.  It appears to have multiple herbal remedies in it.    4. Bilateral exudative age-related macular degeneration, unspecified stage (HCC)  Patient tells me that she is seeing Dr. Yan for macular degeneration of both eyes and will be having injections.  We will request formal records as I do not have these today.    She also mentions that at this time her back pain is stable and followed by Dr. Jones and not interested in any surgery.    ROS:  Denies any Headache, Blurred Vision, Confusion, Chest pain,  Shortness of breath,  Abdominal pain, Changes of bowel or bladder, Lower ext edema, Fevers, Nights sweats, Weight Changes, Focal weakness or numbness.  And all other systems reviewed and are all negative. POSITIVE FOR : see above        Current Outpatient Medications:   •  traZODone (DESYREL) 50 MG Tab, Take 1 Tablet by mouth at bedtime as needed for Sleep., Disp: 30 Tablet, Rfl: 6  •  propranolol (INDERAL) 20 MG Tab, Take 1 Tablet by mouth every day., Disp: 30 Tablet, Rfl: 6  •  amLODIPine (NORVASC) 5 MG Tab, Take 1  Tablet by mouth every day., Disp: 30 Tablet, Rfl: 11  •  hydroCHLOROthiazide (HYDRODIURIL) 25 MG Tab, Take 1 Tablet by mouth every day., Disp: 30 Tablet, Rfl: 11  •  metoprolol tartrate (LOPRESSOR) 50 MG Tab, Take 1 Tablet by mouth 2 times a day., Disp: 60 Tablet, Rfl: 11  •  lisinopril (PRINIVIL) 40 MG tablet, Take 1 Tablet by mouth every day., Disp: 30 Tablet, Rfl: 11  •  ketorolac (ACULAR) 0.5 % Solution, , Disp: , Rfl:   •  amoxicillin (AMOXIL) 500 MG Cap, Take 1 Capsule by mouth 2 times a day., Disp: 20 Capsule, Rfl: 0  •  Multiple Vitamins-Minerals (PRESERVISION AREDS PO), Take  by mouth., Disp: , Rfl:   •  Potassium 99 MG Tab, Take 1 Tab by mouth. 4 tabs a day, Disp: , Rfl:   •  VITAMIN A PO, Take  by mouth., Disp: , Rfl:   •  therapeutic multivitamin-minerals (THERAGRAN-M) TABS, Take 1 Tab by mouth every day., Disp: , Rfl:   •  Calcium Carb-Cholecalciferol (CALCIUM + D3) 600-200 MG-UNIT TABS, Take  by mouth. 5 daily, Disp: , Rfl:   •  Magnesium 250 MG TABS, Take  by mouth every day., Disp: , Rfl:   •  Cyanocobalamin (VITAMIN B-12) 5000 MCG SUBL, Place  under tongue every day., Disp: , Rfl:   •  Garlic 1000 MG CAPS, Take  by mouth every day., Disp: , Rfl:   •  Cholecalciferol (VITAMIN D3) 5000 UNITS CAPS, Take 1 Cap by mouth every day., Disp: , Rfl:   •  Omega-3 Fatty Acids (FISH OIL) 1200 MG CAPS, Take  by mouth. 5 caps daily, Disp: , Rfl:     Allergies   Allergen Reactions   • Percodan  [Kdc:Yellow Dye+Aspirin+Oxycodone] Vomiting   • Percodan-Varsha  [Oxycodone-Aspirin] Vomiting     Other reaction(s): Dizziness       Past Medical History:   Diagnosis Date   • Back pain    • Hypertension    • Leukopenia 12/14/2018   • Neutropenia (HCC) 12/14/2018   • Unspecified cataract     bilateral removal      Past Surgical History:   Procedure Laterality Date   • BLOCK EPIDURAL STEROID INJECTION Right 1/28/2021    Procedure: INJECTION, STEROID, EPIDURAL;  Surgeon: CHRISSY Jones M.D.;  Location: [facility];  Service: Pain Management   • CATARACT PHACO WITH IOL  9/10/2014    Performed by Thanh Lloyd M.D. at SURGERY SAME DAY [facility] ORS   • CATARACT PHACO WITH IOL  8/27/2014    Performed by Thanh Lloyd M.D. at SURGERY SAME DAY [facility] ORS   • CHOLECYSTECTOMY  12/4/2008    lap arturo   • HYSTERECTOMY, TOTAL ABDOMINAL       Family History   Problem Relation Age of Onset   • Diabetes Neg Hx    • Hyperlipidemia Neg Hx    • Hypertension Neg Hx      Social History     Socioeconomic History   • Marital status: Single     Spouse name: Not on file   • Number of children: Not on file   • Years of education: Not on file   • Highest education level: Not on file   Occupational History     Employer: OTHER   Tobacco Use   • Smoking status: Never Smoker   • Smokeless tobacco: Never Used   Vaping Use   • Vaping Use: Never used   Substance and Sexual Activity   • Alcohol use: No   • Drug use: No   • Sexual activity: Not Currently   Other Topics Concern   • Not on file   Social History Narrative   • Not on file     Social Determinants of Health     Financial Resource Strain: Not on file   Food Insecurity: Not on file   Transportation Needs: Not on file   Physical Activity: Not on file   Stress: Not on file   Social Connections: Not on file   Intimate Partner Violence: Not on file   Housing Stability: Not on file       Objective:     Vitals: /58 (BP Location: Right arm, Patient Position: Sitting, BP Cuff Size: Adult) Comment: BP Re-Check  Pulse 68   Temp 36.3 °C (97.3 °F) (Temporal)   Ht 1.651 m (5' 5\")   Wt 60.3 kg (133 lb)   SpO2 97%   BMI 22.13 kg/m²    General: Alert, pleasant, NAD  HEENT: Normocephalic.  Neck supple.   Respiratory: no distress, no audible wheezing, RR -WNL  Skin: Warm, dry, no rashes.  Extremities: No leg edema. No discoloration  Neurological: No tremors  Psych:  Affect/mood is normal, judgement is good, memory is intact, grooming is appropriate.    Assessment/Plan:     Елена was " seen today for tremors.    Diagnoses and all orders for this visit:    Adjustment disorder with anxious mood  Not well controlled.  Restart propranolol follow-up in 6 to 8 weeks.  May consider low-dose sertraline at 25 mg if this has not been helpful for her.    Essential tremor  Chronic, would like to resume propranolol.  States he tolerated his medication well without any reported side effects.  We are cautious because she is already taking metoprolol.  Follow-up in 6 weeks to see if this has been helpful.  Hold on referral to neurology at this time.  Consider primidone.      -     propranolol (INDERAL) 20 MG Tab; Take 1 Tablet by mouth every day.    Insomnia, unspecified type  Not well controlled.  May trial trazodone for this.  Follow-up in 6 to 8 weeks to assess if this has been helpful for her.  She might trial the relaxing that she had mentioned.        traZODone (DESYREL) 50 MG Tab; Take 1 Tablet by mouth at bedtime as needed for Sleep.    Bilateral exudative age-related macular degeneration  , unspecified stage (HCC)  Requesting formal records.      Return in about 7 weeks (around 5/4/2022).          Miya CASTAÑEDA

## 2022-03-16 NOTE — LETTER
[facility]  JUAN C KnutsnoP.RRonaN.  [address]  [facility] [address]  Fax: [phone]   Authorization for Release/Disclosure of   Protected Health Information   Name: MULUGETA MISHRA : 1935 SSN: [id number]   Address: [address] [place], [place] [address]  [facility] [address] Phone:    [phone] (home)    I authorize the entity listed below to release/disclose the PHI below to:   [facility]/Miya Xiong A.P.R.LAMINE. and JUAN C KnutsonP.RREGLA   Provider or Entity Name:  Dr. Fritz Yan ([facility])   Address   City, [facility], [facility]   Phone:      Fax:     Reason for request: continuity of care   Information to be released:    [  ] LAST COLONOSCOPY,  including any PATH REPORT and follow-up  [  ] LAST FIT/COLOGUARD RESULT [  ] LAST DEXA  [  ] LAST MAMMOGRAM  [  ] LAST PAP  [  ] LAST LABS [  ] RETINA EXAM REPORT  [  ] IMMUNIZATION RECORDS  [  ] Release all info      [  ] Check here and initial the line next to each item to release ALL health information INCLUDING  _____ Care and treatment for drug and / or alcohol abuse  _____ HIV testing, infection status, or AIDS  _____ Genetic Testing    DATES OF SERVICE OR TIME PERIOD TO BE DISCLOSED: _____________  I understand and acknowledge that:  * This Authorization may be revoked at any time by you in writing, except if your health information has already been used or disclosed.  * Your health information that will be used or disclosed as a result of you signing this authorization could be re-disclosed by the recipient. If this occurs, your re-disclosed health information may no longer be protected by State or Federal laws.  * You may refuse to sign this Authorization. Your refusal will not affect your ability to obtain treatment.  * This Authorization becomes effective upon signing and will  on (date) __________.      If no date is indicated, this Authorization will  one (1) year from the signature date.    Name: Mulugeta NORIEGA  Pan    Signature:   Date:     3/16/2022       PLEASE FAX REQUESTED RECORDS BACK TO: [phone]

## 2022-05-04 ENCOUNTER — OFFICE VISIT (OUTPATIENT)
Dept: MEDICAL GROUP | Facility: MEDICAL CENTER | Age: 87
End: 2022-05-04
Payer: MEDICARE

## 2022-05-04 VITALS
HEIGHT: 65 IN | WEIGHT: 138.01 LBS | TEMPERATURE: 97.8 F | SYSTOLIC BLOOD PRESSURE: 160 MMHG | OXYGEN SATURATION: 95 % | BODY MASS INDEX: 22.99 KG/M2 | DIASTOLIC BLOOD PRESSURE: 62 MMHG | HEART RATE: 65 BPM

## 2022-05-04 DIAGNOSIS — F43.22 ADJUSTMENT DISORDER WITH ANXIOUS MOOD: ICD-10-CM

## 2022-05-04 DIAGNOSIS — G47.00 INSOMNIA, UNSPECIFIED TYPE: ICD-10-CM

## 2022-05-04 PROCEDURE — 99213 OFFICE O/P EST LOW 20 MIN: CPT | Performed by: NURSE PRACTITIONER

## 2022-05-04 ASSESSMENT — FIBROSIS 4 INDEX: FIB4 SCORE: 1.66

## 2022-05-04 NOTE — PROGRESS NOTES
Chief Complaint   Patient presents with   • Insomnia     7 Wk Fv; did not start med PCP recommended     Subjective:     HPI:     Елена Perla is a 86 y.o. female   here to discuss the evaluation and management of:     Insomnia, unspecified type  Did not trial the Trazodone that was prescribed at last visit. She was concerned by the side effects.   Tells me she is sleeping a little better anyway. She is pleased with this.   Not interested in a new medications now. She will reach out if she would like to further discuss.     Anxiety  Restarting propranolol has been helpful.     Of note she mentions some pain in her left foot-second toe. Believes it to be hammertoe. Not interested in workup now. Will watch. Has been dancing more. Recommend Epson slat.    She continues to enjoy dancing. This was very enjoyable for her.     ROS:  Denies any Headache, Blurred Vision, Confusion, Chest pain,  Shortness of breath,  Abdominal pain, Changes of bowel or bladder, Lower ext edema, Fevers, Nights sweats, Weight Changes, Focal weakness or numbness.  And all other systems reviewed and are all negative. POSITIVE FOR : see above        Current Outpatient Medications:   •  propranolol (INDERAL) 20 MG Tab, Take 1 Tablet by mouth every day., Disp: 30 Tablet, Rfl: 6  •  amLODIPine (NORVASC) 5 MG Tab, Take 1 Tablet by mouth every day., Disp: 30 Tablet, Rfl: 11  •  hydroCHLOROthiazide (HYDRODIURIL) 25 MG Tab, Take 1 Tablet by mouth every day., Disp: 30 Tablet, Rfl: 11  •  metoprolol tartrate (LOPRESSOR) 50 MG Tab, Take 1 Tablet by mouth 2 times a day., Disp: 60 Tablet, Rfl: 11  •  lisinopril (PRINIVIL) 40 MG tablet, Take 1 Tablet by mouth every day., Disp: 30 Tablet, Rfl: 11  •  ketorolac (ACULAR) 0.5 % Solution, , Disp: , Rfl:   •  amoxicillin (AMOXIL) 500 MG Cap, Take 1 Capsule by mouth 2 times a day., Disp: 20 Capsule, Rfl: 0  •  Potassium 99 MG Tab, Take 1 Tab by mouth. 4 tabs a day, Disp: , Rfl:   •  VITAMIN A PO, Take  by mouth.,  Disp: , Rfl:   •  therapeutic multivitamin-minerals (THERAGRAN-M) TABS, Take 1 Tab by mouth every day., Disp: , Rfl:   •  Calcium Carb-Cholecalciferol (CALCIUM + D3) 600-200 MG-UNIT TABS, Take  by mouth. 5 daily, Disp: , Rfl:   •  Magnesium 250 MG TABS, Take  by mouth every day., Disp: , Rfl:   •  Cyanocobalamin (VITAMIN B-12) 5000 MCG SUBL, Place  under tongue every day., Disp: , Rfl:   •  Garlic 1000 MG CAPS, Take  by mouth every day., Disp: , Rfl:   •  Cholecalciferol (VITAMIN D3) 5000 UNITS CAPS, Take 1 Cap by mouth every day., Disp: , Rfl:   •  Omega-3 Fatty Acids (FISH OIL) 1200 MG CAPS, Take  by mouth. 5 caps daily, Disp: , Rfl:   •  Multiple Vitamins-Minerals (PRESERVISION AREDS PO), Take  by mouth., Disp: , Rfl:     Allergies   Allergen Reactions   • Percodan  [Kdc:Yellow Dye+Aspirin+Oxycodone] Vomiting   • Percodan-Varsha  [Oxycodone-Aspirin] Vomiting     Other reaction(s): Dizziness       Past Medical History:   Diagnosis Date   • Back pain    • Hypertension    • Leukopenia 12/14/2018   • Neutropenia (HCC) 12/14/2018   • Unspecified cataract     bilateral removal      Past Surgical History:   Procedure Laterality Date   • BLOCK EPIDURAL STEROID INJECTION Right 1/28/2021    Procedure: INJECTION, STEROID, EPIDURAL;  Surgeon: CHRISSY Jones M.D.;  [facility];  Service: Pain Management   • CATARACT PHACO WITH IOL  9/10/2014    Performed by Thanh Lloyd M.D. at SURGERY SAME DAY [facility] ORS   • CATARACT PHACO WITH IOL  8/27/2014    Performed by Thanh Lloyd M.D. at [facility]   • CHOLECYSTECTOMY  12/4/2008    lap arturo   • HYSTERECTOMY, TOTAL ABDOMINAL       Family History   Problem Relation Age of Onset   • Diabetes Neg Hx    • Hyperlipidemia Neg Hx    • Hypertension Neg Hx      Social History     Socioeconomic History   • Marital status: Single     Spouse name: Not on file   • Number of children: Not on file   • Years of education: Not on file   •  "Highest education level: Not on file   Occupational History     Employer: OTHER   Tobacco Use   • Smoking status: Never Smoker   • Smokeless tobacco: Never Used   Vaping Use   • Vaping Use: Never used   Substance and Sexual Activity   • Alcohol use: No   • Drug use: No   • Sexual activity: Not Currently   Other Topics Concern   • Not on file   Social History Narrative   • Not on file     Social Determinants of Health     Financial Resource Strain: Not on file   Food Insecurity: Not on file   Transportation Needs: Not on file   Physical Activity: Not on file   Stress: Not on file   Social Connections: Not on file   Intimate Partner Violence: Not on file   Housing Stability: Not on file       Objective:     Vitals: /62 (BP Location: Right arm, Patient Position: Sitting, BP Cuff Size: Adult)   Pulse 65   Temp 36.6 °C (97.8 °F) (Temporal)   Ht 1.651 m (5' 5\")   Wt 62.6 kg (138 lb 0.1 oz)   SpO2 95%   BMI 22.97 kg/m²    General: Alert, pleasant, NAD  HEENT: Normocephalic.  Neck supple.   Respiratory: no distress, no audible wheezing, RR -WNL  Skin: Warm, dry, no rashes.  Extremities: No leg edema. No discoloration  Neurological: No tremors  Psych:  Affect/mood is normal, judgement is good, memory is intact, grooming is appropriate.    Assessment/Plan:     Елена was seen today for insomnia.    Diagnoses and all orders for this visit:    Insomnia, unspecified type  Stable.  Did not trial of trazodone.  In fact her insomnia has improved as her anxiety is also improved.  Believe there is some contribution from the propranolol.  Follow-up as needed.    Adjustment disorder with anxious mood  Improved with resuming propranolol.  Continue.      Return in about 4 months (around 9/4/2022).          Miya CASTAÑEDA"

## 2022-08-26 DIAGNOSIS — I10 ESSENTIAL HYPERTENSION: Chronic | ICD-10-CM

## 2022-08-29 RX ORDER — HYDROCHLOROTHIAZIDE 25 MG/1
TABLET ORAL
Qty: 30 TABLET | Refills: 11 | Status: SHIPPED | OUTPATIENT
Start: 2022-08-29 | End: 2022-09-07 | Stop reason: SDUPTHER

## 2022-08-29 RX ORDER — METOPROLOL TARTRATE 50 MG/1
TABLET, FILM COATED ORAL
Qty: 60 TABLET | Refills: 11 | Status: SHIPPED | OUTPATIENT
Start: 2022-08-29 | End: 2022-09-07 | Stop reason: SDUPTHER

## 2022-08-29 RX ORDER — PROPRANOLOL HYDROCHLORIDE 20 MG/1
20 TABLET ORAL DAILY
Qty: 90 TABLET | Refills: 3 | Status: SHIPPED | OUTPATIENT
Start: 2022-08-29 | End: 2022-09-07 | Stop reason: SDUPTHER

## 2022-08-29 RX ORDER — POTASSIUM CHLORIDE 750 MG/1
20 TABLET, FILM COATED, EXTENDED RELEASE ORAL 3 TIMES DAILY
Qty: 180 TABLET | Refills: 11 | Status: SHIPPED | OUTPATIENT
Start: 2022-08-29 | End: 2022-09-07 | Stop reason: SDUPTHER

## 2022-08-29 RX ORDER — LISINOPRIL 40 MG/1
TABLET ORAL
Qty: 30 TABLET | Refills: 11 | Status: SHIPPED | OUTPATIENT
Start: 2022-08-29 | End: 2022-09-07 | Stop reason: SDUPTHER

## 2022-08-29 RX ORDER — AMLODIPINE BESYLATE 5 MG/1
TABLET ORAL
Qty: 90 TABLET | Refills: 3 | Status: SHIPPED | OUTPATIENT
Start: 2022-08-29 | End: 2022-09-07 | Stop reason: SDUPTHER

## 2022-09-07 ENCOUNTER — OFFICE VISIT (OUTPATIENT)
Dept: MEDICAL GROUP | Facility: MEDICAL CENTER | Age: 87
End: 2022-09-07
Payer: MEDICARE

## 2022-09-07 VITALS
DIASTOLIC BLOOD PRESSURE: 80 MMHG | SYSTOLIC BLOOD PRESSURE: 156 MMHG | WEIGHT: 128 LBS | HEIGHT: 65 IN | BODY MASS INDEX: 21.33 KG/M2 | HEART RATE: 62 BPM | TEMPERATURE: 97 F | OXYGEN SATURATION: 96 %

## 2022-09-07 DIAGNOSIS — R73.01 IFG (IMPAIRED FASTING GLUCOSE): ICD-10-CM

## 2022-09-07 DIAGNOSIS — E78.5 DYSLIPIDEMIA: ICD-10-CM

## 2022-09-07 DIAGNOSIS — F43.22 ADJUSTMENT DISORDER WITH ANXIOUS MOOD: ICD-10-CM

## 2022-09-07 DIAGNOSIS — E87.6 HYPOKALEMIA: ICD-10-CM

## 2022-09-07 DIAGNOSIS — I10 ESSENTIAL HYPERTENSION: Chronic | ICD-10-CM

## 2022-09-07 DIAGNOSIS — G25.0 ESSENTIAL TREMOR: ICD-10-CM

## 2022-09-07 PROCEDURE — 99214 OFFICE O/P EST MOD 30 MIN: CPT | Performed by: NURSE PRACTITIONER

## 2022-09-07 RX ORDER — PROPRANOLOL HYDROCHLORIDE 20 MG/1
20 TABLET ORAL DAILY
Qty: 30 TABLET | Refills: 11 | Status: SHIPPED | OUTPATIENT
Start: 2022-10-01 | End: 2023-05-19

## 2022-09-07 RX ORDER — AMLODIPINE BESYLATE 5 MG/1
5 TABLET ORAL DAILY
Qty: 30 TABLET | Refills: 11 | Status: SHIPPED | OUTPATIENT
Start: 2022-10-01 | End: 2023-05-19

## 2022-09-07 RX ORDER — LISINOPRIL 40 MG/1
40 TABLET ORAL DAILY
Qty: 30 TABLET | Refills: 11 | Status: SHIPPED | OUTPATIENT
Start: 2022-10-01 | End: 2023-09-13 | Stop reason: SDUPTHER

## 2022-09-07 RX ORDER — HYDROCHLOROTHIAZIDE 25 MG/1
25 TABLET ORAL DAILY
Qty: 30 TABLET | Refills: 11 | Status: SHIPPED | OUTPATIENT
Start: 2022-10-01 | End: 2023-09-13 | Stop reason: SDUPTHER

## 2022-09-07 RX ORDER — POTASSIUM CHLORIDE 750 MG/1
20 TABLET, FILM COATED, EXTENDED RELEASE ORAL 3 TIMES DAILY
Qty: 180 TABLET | Refills: 11 | Status: SHIPPED | OUTPATIENT
Start: 2022-10-01 | End: 2023-09-13 | Stop reason: SDUPTHER

## 2022-09-07 RX ORDER — METOPROLOL TARTRATE 50 MG/1
50 TABLET, FILM COATED ORAL 2 TIMES DAILY
Qty: 60 TABLET | Refills: 11 | Status: SHIPPED | OUTPATIENT
Start: 2022-10-01 | End: 2023-09-13 | Stop reason: SDUPTHER

## 2022-09-07 ASSESSMENT — FIBROSIS 4 INDEX: FIB4 SCORE: 1.68

## 2022-09-07 NOTE — PROGRESS NOTES
Chief Complaint   Patient presents with    Insomnia     4 Mth Fv     Subjective:     HPI:     Елена Perla is a 87 y.o. female   here to discuss the evaluation and management of:     Would like her pharmacy changed to [facility]. Frustrated with [facility]. Have resent all of her medications as requesting for # 30 tablets only with refills. She has made this very clear that she does not want 90 days supplies.    Essential hypertension  Currently taking lisinopril 40 mg, metoprolol 50 mg twice daily, HCTZ 25 mg, amlodipine 5 mg daily no CP, worsening FLORES, leg swelling, headache, dizziness.  Needs refills sent to [facility].      Hypokalemia  Compliant with supplementation.  Needs refill sent to [facility].    Dyslipidemia  Managed with lifestyle modifications.       Pre-diabetes  Last A1c 5.4%.    Essential Tremor  Propranolol effective for her anxiety and her tremors.  [facility]    Adjustment disorder with anxious mood  Propranolol effective for this.  Would like [facility] to [facility].    Has had epidural #14 a week ago for her leg pain.  Has lost weight but attributes to pain.  F/b Dr. Marino now.   Has another dance coming up in 2 weeks.     ROS: : see above      Current Outpatient Medications:     [START ON 10/1/2022] hydroCHLOROthiazide (HYDRODIURIL) 25 MG Tab, Take 1 Tablet by mouth every day., Disp: 30 Tablet, Rfl: 11    [START ON 10/1/2022] metoprolol tartrate (LOPRESSOR) 50 MG Tab, Take 1 Tablet by mouth 2 times a day., Disp: 60 Tablet, Rfl: 11    [START ON 10/1/2022] lisinopril (PRINIVIL) 40 MG tablet, Take 1 Tablet by mouth every day., Disp: 30 Tablet, Rfl: 11    [START ON 10/1/2022] amLODIPine (NORVASC) 5 MG Tab, Take 1 Tablet by mouth every day., Disp: 30 Tablet, Rfl: 11    [START ON 10/1/2022] propranolol (INDERAL) 20 MG Tab, Take 1 Tablet by mouth every day., Disp: 30 Tablet, Rfl: 11    [START ON 10/1/2022] potassium chloride ER (KLOR-CON) 10 MEQ tablet, Take 2 Tablets by mouth 3  times a day for 30 days. for 30 days, Disp: 180 Tablet, Rfl: 11    ketorolac (ACULAR) 0.5 % Solution, , Disp: , Rfl:     amoxicillin (AMOXIL) 500 MG Cap, Take 1 Capsule by mouth 2 times a day., Disp: 20 Capsule, Rfl: 0    Multiple Vitamins-Minerals (PRESERVISION AREDS PO), Take  by mouth., Disp: , Rfl:     Potassium 99 MG Tab, Take 1 Tab by mouth. 4 tabs a day, Disp: , Rfl:     VITAMIN A PO, Take  by mouth., Disp: , Rfl:     therapeutic multivitamin-minerals (THERAGRAN-M) TABS, Take 1 Tab by mouth every day., Disp: , Rfl:     Calcium Carb-Cholecalciferol (CALCIUM + D3) 600-200 MG-UNIT TABS, Take  by mouth. 5 daily, Disp: , Rfl:     Magnesium 250 MG TABS, Take  by mouth every day., Disp: , Rfl:     Cyanocobalamin (VITAMIN B-12) 5000 MCG SUBL, Place  under tongue every day., Disp: , Rfl:     Garlic 1000 MG CAPS, Take  by mouth every day., Disp: , Rfl:     Cholecalciferol (VITAMIN D3) 5000 UNITS CAPS, Take 1 Cap by mouth every day., Disp: , Rfl:     Omega-3 Fatty Acids (FISH OIL) 1200 MG CAPS, Take  by mouth. 5 caps daily, Disp: , Rfl:     Allergies   Allergen Reactions    Percodan  [Kdc:Yellow Dye+Aspirin+Oxycodone] Vomiting    Percodan-Varsha  [Oxycodone-Aspirin] Vomiting     Other reaction(s): Dizziness       Past Medical History:   Diagnosis Date    Back pain     Hypertension     Leukopenia 12/14/2018    Neutropenia (HCC) 12/14/2018    Unspecified cataract     bilateral removal      Past Surgical History:   Procedure Laterality Date    BLOCK EPIDURAL STEROID INJECTION Right 1/28/2021    Procedure: INJECTION, STEROID, EPIDURAL;  Surgeon: CHRISSY Jones M.D.;  Location: [facility];  Service: Pain Management    CATARACT PHACO WITH IOL  9/10/2014    Performed by Thanh Lloyd M.D. at SURGERY SAME DAY [facility] ORS    CATARACT PHACO WITH IOL  8/27/2014    Performed by Thanh Lloyd M.D. at SURGERY SAME DAY [facility] ORS    CHOLECYSTECTOMY  12/4/2008    lap arturo    HYSTERECTOMY, TOTAL ABDOMINAL   "     Family History   Problem Relation Age of Onset    Diabetes Neg Hx     Hyperlipidemia Neg Hx     Hypertension Neg Hx      Social History     Socioeconomic History    Marital status: Single     Spouse name: Not on file    Number of children: Not on file    Years of education: Not on file    Highest education level: Not on file   Occupational History     Employer: OTHER   Tobacco Use    Smoking status: Never    Smokeless tobacco: Never   Vaping Use    Vaping Use: Never used   Substance and Sexual Activity    Alcohol use: No    Drug use: No    Sexual activity: Not Currently   Other Topics Concern    Not on file   Social History Narrative    Not on file     Social Determinants of Health     Financial Resource Strain: Not on file   Food Insecurity: Not on file   Transportation Needs: Not on file   Physical Activity: Not on file   Stress: Not on file   Social Connections: Not on file   Intimate Partner Violence: Not on file   Housing Stability: Not on file       Objective:     Vitals: BP (!) 156/80 (BP Location: Right arm, Patient Position: Sitting, BP Cuff Size: Adult)   Pulse 62   Temp 36.1 °C (97 °F) (Temporal)   Ht 1.651 m (5' 5\")   Wt 58.1 kg (128 lb)   SpO2 96%   BMI 21.30 kg/m²    General: Alert, pleasant, NAD  HEENT: Normocephalic.  Neck supple.   Respiratory: no distress, no audible wheezing, RR -WNL  Skin: Warm, dry, no rashes.  Extremities: No leg edema. No discoloration  Neurological: No tremors  Psych:  Affect/mood is normal, judgement is good, memory is intact, grooming is appropriate.    Assessment/Plan:     Елена was seen today for insomnia.    Diagnoses and all orders for this visit:    Essential hypertension  Chronic.  Compliant with medication.  She is a bit stressed today as she is frustrated with her medication and having to change pharmacies.  No chest pain, shortness of breath or dizziness.  She is very active.  Continue current regimen.  No changes made today.  Update labs.  Follow-up 4 " months.  -     hydroCHLOROthiazide (HYDRODIURIL) 25 MG Tab; Take 1 Tablet by mouth every day.  -     metoprolol tartrate (LOPRESSOR) 50 MG Tab; Take 1 Tablet by mouth 2 times a day.  -     lisinopril (PRINIVIL) 40 MG tablet; Take 1 Tablet by mouth every day.  -     amLODIPine (NORVASC) 5 MG Tab; Take 1 Tablet by mouth every day.  -     TSH WITH REFLEX TO FT4; Future  -     Comp Metabolic Panel; Future  -     MICROALBUMIN CREAT RATIO URINE; Future  -     CBC WITHOUT DIFFERENTIAL; Future    Dyslipidemia not on statin therapy.  Continue lifestyle modifications.  Update labs.  -     TSH WITH REFLEX TO FT4; Future  -     Lipid Profile; Future    Hypokalemia  Chronic, stable with continued potassium supplementation.  Refills provided.  Update labs.  -     potassium chloride ER (KLOR-CON) 10 MEQ tablet; Take 2 Tablets by mouth 3 times a day for 30 days. for 30 days  -     Comp Metabolic Panel; Future    IFG (impaired fasting glucose)  Monitor A1c annually  -     HEMOGLOBIN A1C; Future    Adjustment disorder with anxious mood  Chronic, propanolol helpful for this.  Consider low-dose sertraline in the future if not effective.  -     propranolol (INDERAL) 20 MG Tab; Take 1 Tablet by mouth every day.    Essential tremor  Chronic.  Propranolol helpful for this.  Refills provided.  -     propranolol (INDERAL) 20 MG Tab; Take 1 Tablet by mouth every day.    Return in about 4 months (around 1/7/2023).          Miya CASTAÑEDA

## 2022-09-17 ENCOUNTER — DOCUMENTATION (OUTPATIENT)
Dept: HEALTH INFORMATION MANAGEMENT | Facility: OTHER | Age: 87
End: 2022-09-17
Payer: MEDICARE

## 2022-12-21 PROBLEM — H35.3230: Status: ACTIVE | Noted: 2022-12-21

## 2022-12-21 PROBLEM — H35.30 MACULAR DEGENERATION OF BOTH EYES: Status: RESOLVED | Noted: 2022-03-16 | Resolved: 2022-12-21

## 2023-01-03 ENCOUNTER — HOSPITAL ENCOUNTER (OUTPATIENT)
Dept: LAB | Facility: MEDICAL CENTER | Age: 88
End: 2023-01-03
Attending: NURSE PRACTITIONER
Payer: MEDICARE

## 2023-01-03 DIAGNOSIS — I10 ESSENTIAL HYPERTENSION: Chronic | ICD-10-CM

## 2023-01-03 DIAGNOSIS — E87.6 HYPOKALEMIA: ICD-10-CM

## 2023-01-03 DIAGNOSIS — R73.01 IFG (IMPAIRED FASTING GLUCOSE): ICD-10-CM

## 2023-01-03 DIAGNOSIS — E78.5 DYSLIPIDEMIA: ICD-10-CM

## 2023-01-03 LAB
ALBUMIN SERPL BCP-MCNC: 4.5 G/DL (ref 3.2–4.9)
ALBUMIN/GLOB SERPL: 1.5 G/DL
ALP SERPL-CCNC: 94 U/L (ref 30–99)
ALT SERPL-CCNC: 16 U/L (ref 2–50)
ANION GAP SERPL CALC-SCNC: 9 MMOL/L (ref 7–16)
AST SERPL-CCNC: 18 U/L (ref 12–45)
BILIRUB SERPL-MCNC: 0.6 MG/DL (ref 0.1–1.5)
BUN SERPL-MCNC: 18 MG/DL (ref 8–22)
CALCIUM ALBUM COR SERPL-MCNC: 9.4 MG/DL (ref 8.5–10.5)
CALCIUM SERPL-MCNC: 9.8 MG/DL (ref 8.5–10.5)
CHLORIDE SERPL-SCNC: 101 MMOL/L (ref 96–112)
CHOLEST SERPL-MCNC: 209 MG/DL (ref 100–199)
CO2 SERPL-SCNC: 29 MMOL/L (ref 20–33)
CREAT SERPL-MCNC: 0.73 MG/DL (ref 0.5–1.4)
CREAT UR-MCNC: 76.32 MG/DL
ERYTHROCYTE [DISTWIDTH] IN BLOOD BY AUTOMATED COUNT: 41.4 FL (ref 35.9–50)
EST. AVERAGE GLUCOSE BLD GHB EST-MCNC: 117 MG/DL
FASTING STATUS PATIENT QL REPORTED: NORMAL
GFR SERPLBLD CREATININE-BSD FMLA CKD-EPI: 79 ML/MIN/1.73 M 2
GLOBULIN SER CALC-MCNC: 3.1 G/DL (ref 1.9–3.5)
GLUCOSE SERPL-MCNC: 99 MG/DL (ref 65–99)
HBA1C MFR BLD: 5.7 % (ref 4–5.6)
HCT VFR BLD AUTO: 43.5 % (ref 37–47)
HDLC SERPL-MCNC: 62 MG/DL
HGB BLD-MCNC: 14.6 G/DL (ref 12–16)
LDLC SERPL CALC-MCNC: 99 MG/DL
MCH RBC QN AUTO: 32.7 PG (ref 27–33)
MCHC RBC AUTO-ENTMCNC: 33.6 G/DL (ref 33.6–35)
MCV RBC AUTO: 97.3 FL (ref 81.4–97.8)
MICROALBUMIN UR-MCNC: 2.1 MG/DL
MICROALBUMIN/CREAT UR: 28 MG/G (ref 0–30)
PLATELET # BLD AUTO: 196 K/UL (ref 164–446)
PMV BLD AUTO: 10.2 FL (ref 9–12.9)
POTASSIUM SERPL-SCNC: 4.1 MMOL/L (ref 3.6–5.5)
PROT SERPL-MCNC: 7.6 G/DL (ref 6–8.2)
RBC # BLD AUTO: 4.47 M/UL (ref 4.2–5.4)
SODIUM SERPL-SCNC: 139 MMOL/L (ref 135–145)
TRIGL SERPL-MCNC: 238 MG/DL (ref 0–149)
TSH SERPL DL<=0.005 MIU/L-ACNC: 3.57 UIU/ML (ref 0.38–5.33)
WBC # BLD AUTO: 6 K/UL (ref 4.8–10.8)

## 2023-01-03 PROCEDURE — 85027 COMPLETE CBC AUTOMATED: CPT

## 2023-01-03 PROCEDURE — 83036 HEMOGLOBIN GLYCOSYLATED A1C: CPT

## 2023-01-03 PROCEDURE — 82043 UR ALBUMIN QUANTITATIVE: CPT

## 2023-01-03 PROCEDURE — 36415 COLL VENOUS BLD VENIPUNCTURE: CPT

## 2023-01-03 PROCEDURE — 80061 LIPID PANEL: CPT

## 2023-01-03 PROCEDURE — 82570 ASSAY OF URINE CREATININE: CPT

## 2023-01-03 PROCEDURE — 84443 ASSAY THYROID STIM HORMONE: CPT

## 2023-01-03 PROCEDURE — 80053 COMPREHEN METABOLIC PANEL: CPT

## 2023-01-04 ENCOUNTER — OFFICE VISIT (OUTPATIENT)
Dept: MEDICAL GROUP | Facility: MEDICAL CENTER | Age: 88
End: 2023-01-04
Payer: MEDICARE

## 2023-01-04 VITALS
HEIGHT: 65 IN | TEMPERATURE: 97 F | SYSTOLIC BLOOD PRESSURE: 170 MMHG | OXYGEN SATURATION: 97 % | WEIGHT: 131 LBS | DIASTOLIC BLOOD PRESSURE: 80 MMHG | BODY MASS INDEX: 21.83 KG/M2 | HEART RATE: 75 BPM

## 2023-01-04 DIAGNOSIS — R73.03 PRE-DIABETES: ICD-10-CM

## 2023-01-04 DIAGNOSIS — I10 ESSENTIAL HYPERTENSION: ICD-10-CM

## 2023-01-04 DIAGNOSIS — E87.6 HYPOKALEMIA: ICD-10-CM

## 2023-01-04 DIAGNOSIS — E78.5 DYSLIPIDEMIA: ICD-10-CM

## 2023-01-04 DIAGNOSIS — F43.22 ADJUSTMENT DISORDER WITH ANXIOUS MOOD: ICD-10-CM

## 2023-01-04 DIAGNOSIS — H35.3230 BILATERAL EXUDATIVE AGE-RELATED MACULAR DEGENERATION, UNSPECIFIED STAGE (HCC): ICD-10-CM

## 2023-01-04 PROCEDURE — 99214 OFFICE O/P EST MOD 30 MIN: CPT | Performed by: NURSE PRACTITIONER

## 2023-01-04 ASSESSMENT — FIBROSIS 4 INDEX: FIB4 SCORE: 2

## 2023-01-04 ASSESSMENT — PATIENT HEALTH QUESTIONNAIRE - PHQ9: CLINICAL INTERPRETATION OF PHQ2 SCORE: 0

## 2023-01-04 NOTE — PROGRESS NOTES
Chief Complaint   Patient presents with    Lab Results     DOS: 1/3/2022     Subjective:     HPI:     Елена Perla is a 87 y.o. female   here to discuss the evaluation and management of:     Presents to review labs.    Anxious:  Very busy at this time.   Stressed out at home.   Found out a few months ago that her son has cancer that is stage 4.     Back Pain:  She tells me she had an epidural with Dr. Marino in September.     Macular Degeneration:  She now has Macular degeneration in both eyes.   Receiving injections.   Still driving.   Sometimes reading can be difficult especially after getting her injections.     HTN  Compliant with medications. No CP, SOB, leg swelling. GFR >60    Prediabetes:  A1c stable at 5.7%    Dyslipidemia:  Lipid panel with slightly bump in her triglycerides  She attributes this to consuming candy.    Hypokalemia:  On potassium supplements  Potassium level stable at 4.1    ROS: : see above      Current Outpatient Medications:     hydroCHLOROthiazide (HYDRODIURIL) 25 MG Tab, Take 1 Tablet by mouth every day., Disp: 30 Tablet, Rfl: 11    metoprolol tartrate (LOPRESSOR) 50 MG Tab, Take 1 Tablet by mouth 2 times a day., Disp: 60 Tablet, Rfl: 11    lisinopril (PRINIVIL) 40 MG tablet, Take 1 Tablet by mouth every day., Disp: 30 Tablet, Rfl: 11    amLODIPine (NORVASC) 5 MG Tab, Take 1 Tablet by mouth every day., Disp: 30 Tablet, Rfl: 11    propranolol (INDERAL) 20 MG Tab, Take 1 Tablet by mouth every day., Disp: 30 Tablet, Rfl: 11    ketorolac (ACULAR) 0.5 % Solution, , Disp: , Rfl:     Multiple Vitamins-Minerals (PRESERVISION AREDS PO), Take  by mouth., Disp: , Rfl:     Potassium 99 MG Tab, Take 1 Tab by mouth. 4 tabs a day, Disp: , Rfl:     VITAMIN A PO, Take  by mouth., Disp: , Rfl:     therapeutic multivitamin-minerals (THERAGRAN-M) TABS, Take 1 Tab by mouth every day., Disp: , Rfl:     Calcium Carb-Cholecalciferol (CALCIUM + D3) 600-200 MG-UNIT TABS, Take  by mouth. 5 daily, Disp: , Rfl:      Magnesium 250 MG TABS, Take  by mouth every day., Disp: , Rfl:     Cyanocobalamin (VITAMIN B-12) 5000 MCG SUBL, Place  under tongue every day., Disp: , Rfl:     Garlic 1000 MG CAPS, Take  by mouth every day., Disp: , Rfl:     Cholecalciferol (VITAMIN D3) 5000 UNITS CAPS, Take 1 Cap by mouth every day., Disp: , Rfl:     Omega-3 Fatty Acids (FISH OIL) 1200 MG CAPS, Take  by mouth. 5 caps daily, Disp: , Rfl:     amoxicillin (AMOXIL) 500 MG Cap, Take 1 Capsule by mouth 2 times a day., Disp: 20 Capsule, Rfl: 0    Allergies   Allergen Reactions    Percodan  [Kdc:Yellow Dye+Aspirin+Oxycodone] Vomiting    Percodan-Varsha  [Oxycodone-Aspirin] Vomiting     Other reaction(s): Dizziness       Past Medical History:   Diagnosis Date    Back pain     Hypertension     Leukopenia 12/14/2018    Neutropenia (HCC) 12/14/2018    Unspecified cataract     bilateral removal      Past Surgical History:   Procedure Laterality Date    BLOCK EPIDURAL STEROID INJECTION Right 1/28/2021    Procedure: INJECTION, STEROID, EPIDURAL;  Surgeon: CHRISSY Jones M.D.;  Location: [facility];  Service: Pain Management    CATARACT PHACO WITH IOL  9/10/2014    Performed by Thanh Lloyd M.D. at SURGERY SAME DAY [facility] ORS    CATARACT PHACO WITH IOL  8/27/2014    Performed by Thanh Lloyd M.D. at SURGERY SAME DAY [facility] ORS    CHOLECYSTECTOMY  12/4/2008    lap arturo    HYSTERECTOMY, TOTAL ABDOMINAL       Family History   Problem Relation Age of Onset    Diabetes Neg Hx     Hyperlipidemia Neg Hx     Hypertension Neg Hx      Social History     Socioeconomic History    Marital status: Single     Spouse name: Not on file    Number of children: Not on file    Years of education: Not on file    Highest education level: Not on file   Occupational History     Employer: OTHER   Tobacco Use    Smoking status: Never    Smokeless tobacco: Never   Vaping Use    Vaping Use: Never used   Substance and Sexual Activity    Alcohol use: No     "Drug use: No    Sexual activity: Not Currently   Other Topics Concern    Not on file   Social History Narrative    Not on file     Social Determinants of Health     Financial Resource Strain: Not on file   Food Insecurity: Not on file   Transportation Needs: Not on file   Physical Activity: Not on file   Stress: Not on file   Social Connections: Not on file   Intimate Partner Violence: Not on file   Housing Stability: Not on file       Objective:     Vitals: BP (!) 170/80 (BP Location: Left arm, Patient Position: Sitting, BP Cuff Size: Adult)   Pulse 75   Temp 36.1 °C (97 °F) (Temporal)   Ht 1.651 m (5' 5\")   Wt 59.4 kg (131 lb)   SpO2 97%   BMI 21.80 kg/m²    General: Alert, pleasant, NAD  HEENT: Normocephalic.  Neck supple.   Respiratory: no distress, no audible wheezing, RR -WNL  Skin: Warm, dry, no rashes.  Extremities: No leg edema. No discoloration  Neurological: No tremors  Psych:  Affect/mood is normal, judgement is good, memory is intact, grooming is appropriate.    Assessment/Plan:     Елена was seen today for lab results.    Diagnoses and all orders for this visit:    Essential hypertension  Chronic. Bp runs on the higher end of normal for patients age. She is endorsing high stress/anxiety given recent cancer diagnosis with her son. She remains very active and symptom free. No changes made today. Recommend continuing current regimen. Follow up 4 months.    Dyslipidemia  Chronic. Not on statins. Manages with lifestyle. Encouraged dietary modifications.    Hypokalemia  Stable. Continue supplementations.    Pre-diabetes  Stable. Monitor annually    Adjustment disorder with anxious mood  Chronic. Increase in stressful events lately.  Continue propranolol.     Bilateral exudative age-related macular degeneration, unspecified stage (HCC)  Chronic. Stable per patient. Continue to follow up with retinal specialists.       Return in about 4 months (around 5/4/2023).          Miya CASTAÑEDA"

## 2023-01-08 PROBLEM — H35.3230: Chronic | Status: ACTIVE | Noted: 2022-12-21

## 2023-01-08 PROBLEM — G25.0 ESSENTIAL TREMOR: Chronic | Status: ACTIVE | Noted: 2021-02-01

## 2023-01-08 PROBLEM — F43.22 ADJUSTMENT DISORDER WITH ANXIOUS MOOD: Chronic | Status: ACTIVE | Noted: 2021-02-01

## 2023-01-08 PROBLEM — G89.29 CHRONIC BILATERAL LOW BACK PAIN WITH BILATERAL SCIATICA: Chronic | Status: ACTIVE | Noted: 2020-01-15

## 2023-01-08 PROBLEM — M54.41 CHRONIC BILATERAL LOW BACK PAIN WITH BILATERAL SCIATICA: Chronic | Status: ACTIVE | Noted: 2020-01-15

## 2023-01-08 PROBLEM — M54.42 CHRONIC BILATERAL LOW BACK PAIN WITH BILATERAL SCIATICA: Chronic | Status: ACTIVE | Noted: 2020-01-15

## 2023-04-28 ENCOUNTER — TELEPHONE (OUTPATIENT)
Dept: HEALTH INFORMATION MANAGEMENT | Facility: OTHER | Age: 88
End: 2023-04-28
Payer: MEDICARE

## 2023-05-10 ENCOUNTER — OFFICE VISIT (OUTPATIENT)
Dept: MEDICAL GROUP | Facility: MEDICAL CENTER | Age: 88
End: 2023-05-10
Payer: MEDICARE

## 2023-05-10 VITALS
DIASTOLIC BLOOD PRESSURE: 68 MMHG | WEIGHT: 133 LBS | HEART RATE: 65 BPM | HEIGHT: 65 IN | BODY MASS INDEX: 22.16 KG/M2 | OXYGEN SATURATION: 95 % | SYSTOLIC BLOOD PRESSURE: 164 MMHG | TEMPERATURE: 96.9 F

## 2023-05-10 DIAGNOSIS — M54.41 CHRONIC BILATERAL LOW BACK PAIN WITH BILATERAL SCIATICA: Chronic | ICD-10-CM

## 2023-05-10 DIAGNOSIS — I10 ESSENTIAL HYPERTENSION: ICD-10-CM

## 2023-05-10 DIAGNOSIS — H35.3230 BILATERAL EXUDATIVE AGE-RELATED MACULAR DEGENERATION, UNSPECIFIED STAGE (HCC): Chronic | ICD-10-CM

## 2023-05-10 DIAGNOSIS — M54.42 CHRONIC BILATERAL LOW BACK PAIN WITH BILATERAL SCIATICA: Chronic | ICD-10-CM

## 2023-05-10 DIAGNOSIS — G89.29 CHRONIC BILATERAL LOW BACK PAIN WITH BILATERAL SCIATICA: Chronic | ICD-10-CM

## 2023-05-10 DIAGNOSIS — R29.818 TRANSIENT NEUROLOGICAL SYMPTOMS: ICD-10-CM

## 2023-05-10 PROCEDURE — 3077F SYST BP >= 140 MM HG: CPT | Performed by: NURSE PRACTITIONER

## 2023-05-10 PROCEDURE — 99214 OFFICE O/P EST MOD 30 MIN: CPT | Performed by: NURSE PRACTITIONER

## 2023-05-10 PROCEDURE — 3078F DIAST BP <80 MM HG: CPT | Performed by: NURSE PRACTITIONER

## 2023-05-10 RX ORDER — AMLODIPINE BESYLATE 10 MG/1
10 TABLET ORAL DAILY
Qty: 30 TABLET | Refills: 0 | Status: SHIPPED | OUTPATIENT
Start: 2023-05-10 | End: 2023-06-15

## 2023-05-10 ASSESSMENT — FIBROSIS 4 INDEX: FIB4 SCORE: 2

## 2023-05-10 NOTE — PROGRESS NOTES
"Chief Complaint   Patient presents with    Hypertension Follow-up     4 Mth       Subjective:     HPI:     Елена Perla is a 87 y.o. female here to discuss the following:    Follow up last OV    Reports compliance with her amlodipine, HCTZ, lisinopril, metoprolol.  Denies any shortness of breath, dizziness.  She does mention that a week ago she did some numbness to her left leg into her right foot while she was walking in the grocery store. She also mentions part of her face was also numb.  She was able to get through the grocery store and go home and symptoms had resolved.  She attributes this to the ongoing back pain and has a an epidural scheduled for next Thursday.  She also mentions that she did have some discomfort \"not pain\" in her left upper chest.  No dizziness, lightheadedness, jaw pain, nausea vomiting or arm pain.  She did mention that she had been pulling weeds all day and wonder if it is a muscle strain.  Today she has no symptoms.    Macular degeneration  She mentions that her \"R eye is almost 20/20\" the left is stable/not worsen. Followed by Dr. Yan. Will continue to have injections.     ROS: : see above        Current Outpatient Medications:     amLODIPine (NORVASC) 10 MG Tab, Take 1 Tablet by mouth every day., Disp: 30 Tablet, Rfl: 0    hydroCHLOROthiazide (HYDRODIURIL) 25 MG Tab, Take 1 Tablet by mouth every day., Disp: 30 Tablet, Rfl: 11    metoprolol tartrate (LOPRESSOR) 50 MG Tab, Take 1 Tablet by mouth 2 times a day., Disp: 60 Tablet, Rfl: 11    lisinopril (PRINIVIL) 40 MG tablet, Take 1 Tablet by mouth every day., Disp: 30 Tablet, Rfl: 11    amLODIPine (NORVASC) 5 MG Tab, Take 1 Tablet by mouth every day., Disp: 30 Tablet, Rfl: 11    propranolol (INDERAL) 20 MG Tab, Take 1 Tablet by mouth every day., Disp: 30 Tablet, Rfl: 11    ketorolac (ACULAR) 0.5 % Solution, , Disp: , Rfl:     amoxicillin (AMOXIL) 500 MG Cap, Take 1 Capsule by mouth 2 times a day., Disp: 20 Capsule, Rfl: 0    " "Multiple Vitamins-Minerals (PRESERVISION AREDS PO), Take  by mouth., Disp: , Rfl:     Potassium 99 MG Tab, Take 1 Tab by mouth. 4 tabs a day, Disp: , Rfl:     therapeutic multivitamin-minerals (THERAGRAN-M) TABS, Take 1 Tab by mouth every day., Disp: , Rfl:     Calcium Carb-Cholecalciferol (CALCIUM + D3) 600-200 MG-UNIT TABS, Take  by mouth. 5 daily, Disp: , Rfl:     Magnesium 250 MG TABS, Take  by mouth every day., Disp: , Rfl:     Cyanocobalamin (VITAMIN B-12) 5000 MCG SUBL, Place  under tongue every day., Disp: , Rfl:     Garlic 1000 MG CAPS, Take  by mouth every day., Disp: , Rfl:     Cholecalciferol (VITAMIN D3) 5000 UNITS CAPS, Take 1 Cap by mouth every day., Disp: , Rfl:     Omega-3 Fatty Acids (FISH OIL) 1200 MG CAPS, Take  by mouth. 5 caps daily, Disp: , Rfl:     VITAMIN A PO, Take  by mouth., Disp: , Rfl:     Allergies   Allergen Reactions    Percodan  [Kdc:Yellow Dye+Aspirin+Oxycodone] Vomiting    Percodan-Varsha  [Oxycodone-Aspirin] Vomiting     Other reaction(s): Dizziness       Objective:     Vitals: BP (!) 164/68 (BP Location: Left arm, Patient Position: Sitting, BP Cuff Size: Adult)   Pulse 65   Temp 36.1 °C (96.9 °F) (Temporal)   Ht 1.651 m (5' 5\")   Wt 60.3 kg (133 lb)   SpO2 95%   BMI 22.13 kg/m²    General: Alert, pleasant, NAD  HEENT: Normocephalic.  Neck supple.   Respiratory: no distress, no audible wheezing, RR -WNL  Skin: Warm, dry, no rashes.  Extremities: No leg edema. No discoloration  Neurological: No tremors  Psych:  Affect/mood is normal, judgement is good, memory is intact, grooming is appropriate.    Assessment/Plan:      1. Essential hypertension  Chronic.  Blood pressure is elevated although improved from previous office visit.  She does endorse a bit of stress given her son's recent diagnosis of cancer.  She is very active and essentially had no symptoms limiting her activity.  At this time recommend increasing amlodipine to 10 mg, continue metoprolol, HCTZ and lisinopril at " current dose.  Have asked her to follow back up in about 2 weeks to assess her blood pressure she does not have a blood pressure cuff at home.  EKG next OV.  ER precautions.  - amLODIPine (NORVASC) 10 MG Tab; Take 1 Tablet by mouth every day.  Dispense: 30 Tablet; Refill: 0    2.  Transient neurological symptoms  Acute, resolved on today's visit however symptoms had presented briefly last week while at the grocery store per patient.  She did not seek medical attention at the time.  No residual reported symptoms at this time. Neuro intact today.  Patient does not want aggressive treatment at this time given her resolution on today's visit however we have discussed referral over to cardiology/neurology and additional CV work-up.  She would like to hold on work-up at this time and will follow back up in about 10 days and recheck her blood pressure after increasing her Amlodipine to 10 mg.    3.Chronic bilateral low back pain with bilateral sciatica  Chronic problem, has had multiple epidurals-I believe close to 15 at this time.  Next epidural will be next Thursday.  Have discussed ER precautions and concerning red flags.    4. Bilateral exudative age-related macular degeneration, unspecified stage (HCC)  Chronic problem, stable, in fact improving from her report.  Continue follow-up with eye care specialist.    Return in about 10 days (around 5/20/2023) for Blood Pressure.    {I have placed the above orders and discussed them with an approved delegating provider. The MA is performing the below orders under the direction of Dr. Antonio CASTAÑEDA

## 2023-05-19 ENCOUNTER — OFFICE VISIT (OUTPATIENT)
Dept: MEDICAL GROUP | Facility: MEDICAL CENTER | Age: 88
End: 2023-05-19
Payer: MEDICARE

## 2023-05-19 VITALS
HEIGHT: 65 IN | BODY MASS INDEX: 22.48 KG/M2 | OXYGEN SATURATION: 95 % | WEIGHT: 134.92 LBS | RESPIRATION RATE: 14 BRPM | TEMPERATURE: 98.1 F | SYSTOLIC BLOOD PRESSURE: 142 MMHG | DIASTOLIC BLOOD PRESSURE: 80 MMHG | HEART RATE: 78 BPM

## 2023-05-19 DIAGNOSIS — F43.22 ADJUSTMENT DISORDER WITH ANXIOUS MOOD: ICD-10-CM

## 2023-05-19 DIAGNOSIS — R01.1 MURMUR, CARDIAC: ICD-10-CM

## 2023-05-19 DIAGNOSIS — I10 ESSENTIAL HYPERTENSION: ICD-10-CM

## 2023-05-19 DIAGNOSIS — G25.0 ESSENTIAL TREMOR: ICD-10-CM

## 2023-05-19 DIAGNOSIS — R00.2 HEART PALPITATIONS: ICD-10-CM

## 2023-05-19 PROCEDURE — 3079F DIAST BP 80-89 MM HG: CPT | Performed by: NURSE PRACTITIONER

## 2023-05-19 PROCEDURE — 93000 ELECTROCARDIOGRAM COMPLETE: CPT | Performed by: NURSE PRACTITIONER

## 2023-05-19 PROCEDURE — 99214 OFFICE O/P EST MOD 30 MIN: CPT | Performed by: NURSE PRACTITIONER

## 2023-05-19 PROCEDURE — 3077F SYST BP >= 140 MM HG: CPT | Performed by: NURSE PRACTITIONER

## 2023-05-19 RX ORDER — PROPRANOLOL HYDROCHLORIDE 40 MG/1
40 TABLET ORAL DAILY
Qty: 30 TABLET | Refills: 11 | Status: SHIPPED | OUTPATIENT
Start: 2023-05-19

## 2023-05-19 ASSESSMENT — FIBROSIS 4 INDEX: FIB4 SCORE: 2

## 2023-05-19 NOTE — PROGRESS NOTES
"Chief Complaint   Patient presents with    Follow-Up       Subjective:     HPI:     Елена Perla is a 87 y.o. female here to discuss the following:    Follow up blood pressure.  Last OV her Amlodipine was increased to 10mg.  Her BP has improved since last OV.   No side effects.     She does mention that she has been feeling experiencing her tremors a bit more pronounced lately.  She does endorse anxiety.      She is also been feeling her heart flutter. \"  Not really often\"  She tells me that she is aware of her heart beating.  She still very active, pulling weeds, painting her house, doing house projects.  She tells me she is not limited by her activity.  No syncopal episodes.      ROS: : see above        Current Outpatient Medications:     propranolol (INDERAL) 40 MG Tab, Take 1 Tablet by mouth every day., Disp: 30 Tablet, Rfl: 11    hydroCHLOROthiazide (HYDRODIURIL) 25 MG Tab, Take 1 Tablet by mouth every day., Disp: 30 Tablet, Rfl: 11    metoprolol tartrate (LOPRESSOR) 50 MG Tab, Take 1 Tablet by mouth 2 times a day., Disp: 60 Tablet, Rfl: 11    lisinopril (PRINIVIL) 40 MG tablet, Take 1 Tablet by mouth every day., Disp: 30 Tablet, Rfl: 11    ketorolac (ACULAR) 0.5 % Solution, , Disp: , Rfl:     amoxicillin (AMOXIL) 500 MG Cap, Take 1 Capsule by mouth 2 times a day., Disp: 20 Capsule, Rfl: 0    Multiple Vitamins-Minerals (PRESERVISION AREDS PO), Take  by mouth., Disp: , Rfl:     Potassium 99 MG Tab, Take 1 Tab by mouth. 4 tabs a day, Disp: , Rfl:     VITAMIN A PO, Take  by mouth., Disp: , Rfl:     therapeutic multivitamin-minerals (THERAGRAN-M) TABS, Take 1 Tab by mouth every day., Disp: , Rfl:     Calcium Carb-Cholecalciferol (CALCIUM + D3) 600-200 MG-UNIT TABS, Take  by mouth. 5 daily, Disp: , Rfl:     Magnesium 250 MG TABS, Take  by mouth every day., Disp: , Rfl:     Cyanocobalamin (VITAMIN B-12) 5000 MCG SUBL, Place  under tongue every day., Disp: , Rfl:     Garlic 1000 MG CAPS, Take  by mouth every day., " "Disp: , Rfl:     Cholecalciferol (VITAMIN D3) 5000 UNITS CAPS, Take 1 Cap by mouth every day., Disp: , Rfl:     Omega-3 Fatty Acids (FISH OIL) 1200 MG CAPS, Take  by mouth. 5 caps daily, Disp: , Rfl:     amLODIPine (NORVASC) 10 MG Tab, Take 1 tablet by mouth once daily, Disp: 30 Tablet, Rfl: 11    Allergies   Allergen Reactions    Percodan  [Kdc:Yellow Dye+Aspirin+Oxycodone] Vomiting    Percodan-Varsha  [Oxycodone-Aspirin] Vomiting     Other reaction(s): Dizziness       Objective:     Vitals: BP (!) 142/80 (BP Location: Left arm, Patient Position: Sitting, BP Cuff Size: Adult)   Pulse 78   Temp 36.7 °C (98.1 °F) (Temporal)   Resp 14   Ht 1.651 m (5' 5\")   Wt 61.2 kg (134 lb 14.7 oz)   SpO2 95%   BMI 22.45 kg/m²    General: Alert, pleasant, NAD  HEENT: Normocephalic.  Neck supple.   Respiratory: no distress, no audible wheezing, RR -WNL  Heart: Slight murmur  Skin: Warm, dry, no rashes.  Extremities: No leg edema. No discoloration  Neurological: No tremors  Psych:  Affect/mood is normal, judgement is good, memory is intact, grooming is appropriate.    Assessment/Plan:      1. Essential hypertension  Chronic.  Multidrug therapy.  Blood pressure has improved since the amlodipine increased to 10 mg.  Recommend continuing amlodipine at 10 mg, metoprolol, HCTZ and lisinopril.  - EKG    2. Essential tremor  Chronic.  Recommend increasing to 40 mg daily to see if this can help reduce tremors, follow-up 3 months or sooner if needed.  - propranolol (INDERAL) 40 MG Tab; Take 1 Tablet by mouth every day.  Dispense: 30 Tablet; Refill: 11    3. Murmur, cardiac  Noted on exam.  Recommend echocardiogram to evaluate for valvular insufficiencies including aortic stenosis.  - EC-ECHOCARDIOGRAM COMPLETE W/O CONT; Future    4. Heart palpitations  New problem-symptoms somewhat vague however appears to be describing heart palpitations.  Patient is not too bothered by heart palpitations and is still able to remain very physically " active.  Have considered ordering Holter monitor however at this particular time she is not too interested in having a significant work-up and interventions.  Follow-up 3 months.    5. Adjustment disorder with anxious mood  Chronic.  Ongoing.  Recommend increasing propranolol to 40 mg.  Follow-up 3 months or sooner if needed.  Caution regarding side effects.  - propranolol (INDERAL) 40 MG Tab; Take 1 Tablet by mouth every day.  Dispense: 30 Tablet; Refill: 11      Return in about 3 months (around 8/19/2023).    {I have placed the above orders and discussed them with an approved delegating provider. The MA is performing the below orders under the direction of Dr. Antonio CASTAÑEDA

## 2023-06-14 DIAGNOSIS — I10 ESSENTIAL HYPERTENSION: ICD-10-CM

## 2023-06-15 RX ORDER — AMLODIPINE BESYLATE 10 MG/1
TABLET ORAL
Qty: 30 TABLET | Refills: 11 | Status: SHIPPED | OUTPATIENT
Start: 2023-06-15

## 2023-06-19 PROBLEM — R00.2 HEART PALPITATIONS: Status: ACTIVE | Noted: 2023-06-19

## 2023-06-19 PROBLEM — R01.1 MURMUR, CARDIAC: Status: ACTIVE | Noted: 2023-06-19

## 2023-06-20 ENCOUNTER — OFFICE VISIT (OUTPATIENT)
Dept: MEDICAL GROUP | Facility: MEDICAL CENTER | Age: 88
End: 2023-06-20
Payer: MEDICARE

## 2023-06-20 VITALS
HEIGHT: 65 IN | TEMPERATURE: 98 F | HEART RATE: 63 BPM | DIASTOLIC BLOOD PRESSURE: 58 MMHG | SYSTOLIC BLOOD PRESSURE: 132 MMHG | WEIGHT: 131 LBS | OXYGEN SATURATION: 98 % | BODY MASS INDEX: 21.83 KG/M2

## 2023-06-20 DIAGNOSIS — Z02.4 ENCOUNTER FOR DRIVER'S LICENSE HISTORY AND PHYSICAL: ICD-10-CM

## 2023-06-20 DIAGNOSIS — I10 ESSENTIAL HYPERTENSION: ICD-10-CM

## 2023-06-20 PROCEDURE — 3075F SYST BP GE 130 - 139MM HG: CPT | Performed by: NURSE PRACTITIONER

## 2023-06-20 PROCEDURE — 3078F DIAST BP <80 MM HG: CPT | Performed by: NURSE PRACTITIONER

## 2023-06-20 PROCEDURE — 99213 OFFICE O/P EST LOW 20 MIN: CPT | Performed by: NURSE PRACTITIONER

## 2023-06-20 ASSESSMENT — FIBROSIS 4 INDEX: FIB4 SCORE: 2

## 2023-06-20 NOTE — PROGRESS NOTES
Chief Complaint   Patient presents with    Paperwork     [facility] Physical Eval Form       Subjective:     HPI:     Елена Perla is a 87 y.o. female here to discuss the following:    Patient presents today to have the [facility] paperwork completed.  She is already had her eye exam completed.  Denies any recent episodes of syncope, seizures or focal weakness.    Her blood pressure is very stable in the clinic today.  She is asymptomatic.  Compliant with her medications.        ROS: : see above        Current Outpatient Medications:     amLODIPine (NORVASC) 10 MG Tab, Take 1 tablet by mouth once daily, Disp: 30 Tablet, Rfl: 11    propranolol (INDERAL) 40 MG Tab, Take 1 Tablet by mouth every day., Disp: 30 Tablet, Rfl: 11    hydroCHLOROthiazide (HYDRODIURIL) 25 MG Tab, Take 1 Tablet by mouth every day., Disp: 30 Tablet, Rfl: 11    metoprolol tartrate (LOPRESSOR) 50 MG Tab, Take 1 Tablet by mouth 2 times a day., Disp: 60 Tablet, Rfl: 11    lisinopril (PRINIVIL) 40 MG tablet, Take 1 Tablet by mouth every day., Disp: 30 Tablet, Rfl: 11    ketorolac (ACULAR) 0.5 % Solution, , Disp: , Rfl:     amoxicillin (AMOXIL) 500 MG Cap, Take 1 Capsule by mouth 2 times a day., Disp: 20 Capsule, Rfl: 0    Multiple Vitamins-Minerals (PRESERVISION AREDS PO), Take  by mouth., Disp: , Rfl:     Potassium 99 MG Tab, Take 1 Tab by mouth. 4 tabs a day, Disp: , Rfl:     VITAMIN A PO, Take  by mouth., Disp: , Rfl:     therapeutic multivitamin-minerals (THERAGRAN-M) TABS, Take 1 Tab by mouth every day., Disp: , Rfl:     Calcium Carb-Cholecalciferol (CALCIUM + D3) 600-200 MG-UNIT TABS, Take  by mouth. 5 daily, Disp: , Rfl:     Magnesium 250 MG TABS, Take  by mouth every day., Disp: , Rfl:     Cyanocobalamin (VITAMIN B-12) 5000 MCG SUBL, Place  under tongue every day., Disp: , Rfl:     Garlic 1000 MG CAPS, Take  by mouth every day., Disp: , Rfl:     Cholecalciferol (VITAMIN D3) 5000 UNITS CAPS, Take 1 Cap by mouth every day., Disp: , Rfl:     Omega-3 Fatty  "Acids (FISH OIL) 1200 MG CAPS, Take  by mouth. 5 caps daily, Disp: , Rfl:     Allergies   Allergen Reactions    Percodan  [Kdc:Yellow Dye+Aspirin+Oxycodone] Vomiting    Percodan-Varsha  [Oxycodone-Aspirin] Vomiting     Other reaction(s): Dizziness       Objective:     Vitals: /58 (BP Location: Right arm, Patient Position: Sitting, BP Cuff Size: Adult)   Pulse 63   Temp 36.7 °C (98 °F) (Temporal)   Ht 1.651 m (5' 5\")   Wt 59.4 kg (131 lb)   SpO2 98%   BMI 21.80 kg/m²    General: Alert, pleasant, NAD  HEENT: Normocephalic.  Neck supple.   Respiratory: no distress, no audible wheezing, RR -WNL  Skin: Warm, dry, no rashes.  Extremities: No leg edema. No discoloration  Neurological: No tremors  Psych:  Affect/mood is normal, judgement is good, memory is intact, grooming is appropriate.    Assessment/Plan:      1. Encounter for 's license history and physical  Form completed for patient, scanned in chart.    2. Essential hypertension  Chronic, complex.  Blood pressure is very stable in the clinic on today's visit.  Asymptomatic.  Continue current regimen.  Follow-up as previously scheduled in September.    Return for Previously scheduled follow-up in September..        Miya CASTAÑEDA"

## 2023-07-04 ENCOUNTER — HOSPITAL ENCOUNTER (OUTPATIENT)
Dept: CARDIOLOGY | Facility: MEDICAL CENTER | Age: 88
End: 2023-07-04
Attending: NURSE PRACTITIONER
Payer: MEDICARE

## 2023-07-04 DIAGNOSIS — R01.1 MURMUR, CARDIAC: ICD-10-CM

## 2023-07-04 LAB
LV EJECT FRACT  99904: 60
LV EJECT FRACT MOD 2C 99903: 50.9
LV EJECT FRACT MOD 4C 99902: 60.36
LV EJECT FRACT MOD BP 99901: 55.51

## 2023-07-04 PROCEDURE — 93306 TTE W/DOPPLER COMPLETE: CPT

## 2023-07-04 PROCEDURE — 93306 TTE W/DOPPLER COMPLETE: CPT | Mod: 26 | Performed by: INTERNAL MEDICINE

## 2023-09-13 ENCOUNTER — APPOINTMENT (OUTPATIENT)
Dept: MEDICAL GROUP | Facility: MEDICAL CENTER | Age: 88
End: 2023-09-13
Payer: MEDICARE

## 2023-09-13 ENCOUNTER — OFFICE VISIT (OUTPATIENT)
Dept: MEDICAL GROUP | Facility: MEDICAL CENTER | Age: 88
End: 2023-09-13
Payer: MEDICARE

## 2023-09-13 VITALS
WEIGHT: 129 LBS | HEART RATE: 60 BPM | RESPIRATION RATE: 16 BRPM | HEIGHT: 65 IN | OXYGEN SATURATION: 96 % | SYSTOLIC BLOOD PRESSURE: 130 MMHG | DIASTOLIC BLOOD PRESSURE: 60 MMHG | BODY MASS INDEX: 21.49 KG/M2 | TEMPERATURE: 99.1 F

## 2023-09-13 DIAGNOSIS — I35.1 MODERATE AORTIC INSUFFICIENCY: ICD-10-CM

## 2023-09-13 DIAGNOSIS — E87.6 HYPOKALEMIA: ICD-10-CM

## 2023-09-13 DIAGNOSIS — Z23 NEED FOR VACCINATION: ICD-10-CM

## 2023-09-13 DIAGNOSIS — E78.5 DYSLIPIDEMIA: Chronic | ICD-10-CM

## 2023-09-13 DIAGNOSIS — I10 ESSENTIAL HYPERTENSION: Chronic | ICD-10-CM

## 2023-09-13 DIAGNOSIS — R73.03 PRE-DIABETES: Chronic | ICD-10-CM

## 2023-09-13 PROBLEM — I07.1 TRICUSPID VALVE INSUFFICIENCY: Status: ACTIVE | Noted: 2023-09-13

## 2023-09-13 PROCEDURE — 3078F DIAST BP <80 MM HG: CPT | Performed by: NURSE PRACTITIONER

## 2023-09-13 PROCEDURE — 99214 OFFICE O/P EST MOD 30 MIN: CPT | Mod: 25 | Performed by: NURSE PRACTITIONER

## 2023-09-13 PROCEDURE — 90662 IIV NO PRSV INCREASED AG IM: CPT | Performed by: NURSE PRACTITIONER

## 2023-09-13 PROCEDURE — G0008 ADMIN INFLUENZA VIRUS VAC: HCPCS | Performed by: NURSE PRACTITIONER

## 2023-09-13 PROCEDURE — 3075F SYST BP GE 130 - 139MM HG: CPT | Performed by: NURSE PRACTITIONER

## 2023-09-13 RX ORDER — POTASSIUM CHLORIDE 750 MG/1
20 TABLET, FILM COATED, EXTENDED RELEASE ORAL 3 TIMES DAILY
Qty: 180 TABLET | Refills: 11 | Status: SHIPPED | OUTPATIENT
Start: 2023-09-13 | End: 2023-10-13

## 2023-09-13 RX ORDER — LISINOPRIL 40 MG/1
40 TABLET ORAL DAILY
Qty: 30 TABLET | Refills: 11 | Status: SHIPPED | OUTPATIENT
Start: 2023-09-13

## 2023-09-13 RX ORDER — LISINOPRIL 20 MG/1
TABLET ORAL
COMMUNITY
End: 2023-10-31

## 2023-09-13 RX ORDER — HYDROCHLOROTHIAZIDE 25 MG/1
25 TABLET ORAL DAILY
Qty: 30 TABLET | Refills: 11 | Status: SHIPPED | OUTPATIENT
Start: 2023-09-13

## 2023-09-13 RX ORDER — PREDNISOLONE ACETATE 10 MG/ML
SUSPENSION/ DROPS OPHTHALMIC
COMMUNITY
End: 2023-10-31

## 2023-09-13 RX ORDER — POTASSIUM CHLORIDE 750 MG/1
TABLET, EXTENDED RELEASE ORAL
COMMUNITY
End: 2023-09-13

## 2023-09-13 RX ORDER — HYDROCHLOROTHIAZIDE 50 MG/1
TABLET ORAL
COMMUNITY
End: 2023-10-31

## 2023-09-13 RX ORDER — POTASSIUM CHLORIDE 750 MG/1
20 TABLET, FILM COATED, EXTENDED RELEASE ORAL 3 TIMES DAILY
COMMUNITY
Start: 2023-08-23

## 2023-09-13 RX ORDER — METOPROLOL TARTRATE 50 MG/1
50 TABLET, FILM COATED ORAL 2 TIMES DAILY
Qty: 60 TABLET | Refills: 11 | Status: SHIPPED | OUTPATIENT
Start: 2023-09-13

## 2023-09-13 RX ORDER — OFLOXACIN 3 MG/ML
SOLUTION/ DROPS OPHTHALMIC
COMMUNITY
End: 2023-10-31

## 2023-09-13 ASSESSMENT — FIBROSIS 4 INDEX: FIB4 SCORE: 2.02
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
## 2023-09-13 NOTE — PROGRESS NOTES
No chief complaint on file.      Subjective:     HPI:     Елена Perla is a 88 y.o. female here to discuss the following:        The test results show that stable pumping strength of your heart.   Moderate aortic insufficiency which indicates that the valve is no longer functioning adequately to help control the flow of blood.  This can be seen in patients over the age of 60.  There is no immediate concerns or need for action at this time however we can consider a consult with a cardiologist just to ensure we are optimizing medical treatment.  If his symptoms such as excessive fatigue, palpitations, worsening shortness of breath with activity or when lying down, swelling of your feet legs or abdomen, chest pain or fainting occur then this requires immediate evaluation      Echocardiography Laboratory     CONCLUSIONS  Normal left ventricular systolic function.  Moderate mitral regurgitation.  Aortic annular calcification and sclerosis.  Moderate aortic insufficiency.  Mild to moderate tricuspid regurgitation.    No problems updated.     ROS: : see above        Current Outpatient Medications:     amLODIPine (NORVASC) 10 MG Tab, Take 1 tablet by mouth once daily, Disp: 30 Tablet, Rfl: 11    propranolol (INDERAL) 40 MG Tab, Take 1 Tablet by mouth every day., Disp: 30 Tablet, Rfl: 11    hydroCHLOROthiazide (HYDRODIURIL) 25 MG Tab, Take 1 Tablet by mouth every day., Disp: 30 Tablet, Rfl: 11    metoprolol tartrate (LOPRESSOR) 50 MG Tab, Take 1 Tablet by mouth 2 times a day., Disp: 60 Tablet, Rfl: 11    lisinopril (PRINIVIL) 40 MG tablet, Take 1 Tablet by mouth every day., Disp: 30 Tablet, Rfl: 11    ketorolac (ACULAR) 0.5 % Solution, , Disp: , Rfl:     amoxicillin (AMOXIL) 500 MG Cap, Take 1 Capsule by mouth 2 times a day., Disp: 20 Capsule, Rfl: 0    Multiple Vitamins-Minerals (PRESERVISION AREDS PO), Take  by mouth., Disp: , Rfl:     Potassium 99 MG Tab, Take 1 Tab by mouth. 4 tabs a day, Disp: , Rfl:     VITAMIN A PO,  Take  by mouth., Disp: , Rfl:     therapeutic multivitamin-minerals (THERAGRAN-M) TABS, Take 1 Tab by mouth every day., Disp: , Rfl:     Calcium Carb-Cholecalciferol (CALCIUM + D3) 600-200 MG-UNIT TABS, Take  by mouth. 5 daily, Disp: , Rfl:     Magnesium 250 MG TABS, Take  by mouth every day., Disp: , Rfl:     Cyanocobalamin (VITAMIN B-12) 5000 MCG SUBL, Place  under tongue every day., Disp: , Rfl:     Garlic 1000 MG CAPS, Take  by mouth every day., Disp: , Rfl:     Cholecalciferol (VITAMIN D3) 5000 UNITS CAPS, Take 1 Cap by mouth every day., Disp: , Rfl:     Omega-3 Fatty Acids (FISH OIL) 1200 MG CAPS, Take  by mouth. 5 caps daily, Disp: , Rfl:     Allergies   Allergen Reactions    Percodan  [Kdc:Yellow Dye+Aspirin+Oxycodone] Vomiting    Percodan-Varsha  [Oxycodone-Aspirin] Vomiting     Other reaction(s): Dizziness       Objective:     Vitals: There were no vitals taken for this visit.   General: Alert, pleasant, NAD  HEENT: Normocephalic.  Neck supple.   Respiratory: no distress, no audible wheezing, RR -WNL  Skin: Warm, dry, no rashes.  Extremities: No leg edema. No discoloration  Neurological: No tremors  Psych:  Affect/mood is normal, judgement is good, memory is intact, grooming is appropriate.    Assessment/Plan:      There are no diagnoses linked to this encounter.    No follow-ups on file.    {I have placed the above orders and discussed them with an approved delegating provider. The MA is performing the below orders under the direction of Dr. Alvarez}      Miya CASTAÑEDA

## 2023-10-31 ENCOUNTER — OFFICE VISIT (OUTPATIENT)
Dept: MEDICAL GROUP | Facility: MEDICAL CENTER | Age: 88
End: 2023-10-31
Payer: MEDICARE

## 2023-10-31 VITALS
TEMPERATURE: 97.6 F | SYSTOLIC BLOOD PRESSURE: 122 MMHG | DIASTOLIC BLOOD PRESSURE: 52 MMHG | HEIGHT: 65 IN | RESPIRATION RATE: 15 BRPM | HEART RATE: 64 BPM | BODY MASS INDEX: 21.85 KG/M2 | WEIGHT: 131.17 LBS | OXYGEN SATURATION: 95 %

## 2023-10-31 DIAGNOSIS — I07.1 TRICUSPID VALVE INSUFFICIENCY, UNSPECIFIED ETIOLOGY: ICD-10-CM

## 2023-10-31 DIAGNOSIS — I35.1 MODERATE AORTIC INSUFFICIENCY: ICD-10-CM

## 2023-10-31 DIAGNOSIS — I10 ESSENTIAL HYPERTENSION: Chronic | ICD-10-CM

## 2023-10-31 DIAGNOSIS — R00.2 HEART PALPITATIONS: ICD-10-CM

## 2023-10-31 PROCEDURE — 3074F SYST BP LT 130 MM HG: CPT | Performed by: NURSE PRACTITIONER

## 2023-10-31 PROCEDURE — 99214 OFFICE O/P EST MOD 30 MIN: CPT | Performed by: NURSE PRACTITIONER

## 2023-10-31 PROCEDURE — 3078F DIAST BP <80 MM HG: CPT | Performed by: NURSE PRACTITIONER

## 2023-10-31 ASSESSMENT — FIBROSIS 4 INDEX: FIB4 SCORE: 2.02

## 2023-10-31 NOTE — PROGRESS NOTES
Chief Complaint   Patient presents with    Heart Problem     Pt concerned about heart murmur; requests referral to cardiologist        Subjective:     HPI:     Елена Perla is a 88 y.o. female here to discuss the following:    Would like to have referral to Cardiology.     Patient states that since last office visit she has decided that she would like to have a referral to cardiology for further evaluation recommendation.  She she tells me that she occasionally will have a discomfort/pressure in her chest that can be intermittent.  She is having a difficult time describing her symptoms although it does sound like she might be experiencing palpitations.  She denies having an overt pain however states it is bothersome for her.  She denies any new shortness of breath, leg swelling, dizziness.  She does have some fatigue however she does attribute this to doing a lot of yard/housework.  Patient is very active.  She still goes dancing every now and then.    Echo 7/4/2023  CONCLUSIONS  Normal left ventricular systolic function.  Moderate mitral regurgitation.  Aortic annular calcification and sclerosis.  Moderate aortic insufficiency.  Mild to moderate tricuspid regurgitation.       ROS: : see above        Current Outpatient Medications:     potassium chloride ER (KLOR-CON) 10 MEQ tablet, Take 20 mEq by mouth 3 times a day., Disp: , Rfl:     hydroCHLOROthiazide (HYDRODIURIL) 25 MG Tab, Take 1 Tablet by mouth every day., Disp: 30 Tablet, Rfl: 11    metoprolol tartrate (LOPRESSOR) 50 MG Tab, Take 1 Tablet by mouth 2 times a day., Disp: 60 Tablet, Rfl: 11    lisinopril (PRINIVIL) 40 MG tablet, Take 1 Tablet by mouth every day., Disp: 30 Tablet, Rfl: 11    amLODIPine (NORVASC) 10 MG Tab, Take 1 tablet by mouth once daily, Disp: 30 Tablet, Rfl: 11    propranolol (INDERAL) 40 MG Tab, Take 1 Tablet by mouth every day., Disp: 30 Tablet, Rfl: 11    ketorolac (ACULAR) 0.5 % Solution, , Disp: , Rfl:     amoxicillin (AMOXIL) 500 MG  "Cap, Take 1 Capsule by mouth 2 times a day. (Patient not taking: Reported on 9/13/2023), Disp: 20 Capsule, Rfl: 0    Multiple Vitamins-Minerals (PRESERVISION AREDS PO), Take  by mouth., Disp: , Rfl:     Potassium 99 MG Tab, Take 1 Tab by mouth. 4 tabs a day, Disp: , Rfl:     VITAMIN A PO, Take  by mouth., Disp: , Rfl:     therapeutic multivitamin-minerals (THERAGRAN-M) TABS, Take 1 Tab by mouth every day., Disp: , Rfl:     Calcium Carb-Cholecalciferol (CALCIUM + D3) 600-200 MG-UNIT TABS, Take  by mouth. 5 daily, Disp: , Rfl:     Magnesium 250 MG TABS, Take  by mouth every day., Disp: , Rfl:     Cyanocobalamin (VITAMIN B-12) 5000 MCG SUBL, Place  under tongue every day., Disp: , Rfl:     Garlic 1000 MG CAPS, Take  by mouth every day., Disp: , Rfl:     Cholecalciferol (VITAMIN D3) 5000 UNITS CAPS, Take 1 Cap by mouth every day., Disp: , Rfl:     Omega-3 Fatty Acids (FISH OIL) 1200 MG CAPS, Take  by mouth. 5 caps daily, Disp: , Rfl:     Allergies   Allergen Reactions    Other Misc      percodan    Other reaction(s): dizziness, vomiting    Percodan  [Kdc:Yellow Dye+Aspirin+Oxycodone] Vomiting    Percodan-Varsha  [Oxycodone-Aspirin] Vomiting     Other reaction(s): Dizziness       Objective:     Vitals: /52 (BP Location: Left arm, Patient Position: Sitting, BP Cuff Size: Adult)   Pulse 64   Temp 36.4 °C (97.6 °F) (Temporal)   Resp 15   Ht 1.651 m (5' 5\")   Wt 59.5 kg (131 lb 2.8 oz)   SpO2 95%   BMI 21.83 kg/m²    General: Alert, pleasant, NAD  HEENT: Normocephalic.  Neck supple.   Respiratory: no distress, no audible wheezing, RR -WNL  Skin: Warm, dry, no rashes.  Extremities: No leg edema. No discoloration  Neurological: No tremors  Psych:  Affect/mood is normal, judgement is good, memory is intact, grooming is appropriate.    Assessment/Plan:      1. Heart palpitations  Patient is describing what seems to be intermittent palpitations.  Consider cardiac monitor.   She will discuss with Cardiology.   - " REFERRAL TO CARDIOLOGY    2. Moderate aortic insufficiency  Moderate-clinically she is stable.   Consult Cardiology.   - REFERRAL TO CARDIOLOGY    3. Essential hypertension  Chronic, multi drug therapy.  Compliant with medications.  Denies any shortness of breath, dizziness, headaches.  Experiencing what sounds like palpitations, denies overt chest pain.    4. Tricuspid valve insufficiency, unspecified etiology  Noted mild to moderate on echocardiogram.    Return in about 3 months (around 1/31/2024).        Miya MCPHERSON.

## 2023-11-07 ENCOUNTER — OFFICE VISIT (OUTPATIENT)
Dept: MEDICAL GROUP | Facility: MEDICAL CENTER | Age: 88
End: 2023-11-07
Payer: MEDICARE

## 2023-11-07 VITALS
TEMPERATURE: 97.6 F | BODY MASS INDEX: 20.99 KG/M2 | HEART RATE: 68 BPM | SYSTOLIC BLOOD PRESSURE: 124 MMHG | WEIGHT: 126 LBS | DIASTOLIC BLOOD PRESSURE: 70 MMHG | RESPIRATION RATE: 16 BRPM | HEIGHT: 65 IN | OXYGEN SATURATION: 98 %

## 2023-11-07 DIAGNOSIS — R00.2 HEART PALPITATIONS: ICD-10-CM

## 2023-11-07 DIAGNOSIS — I35.1 MODERATE AORTIC INSUFFICIENCY: ICD-10-CM

## 2023-11-07 DIAGNOSIS — F41.9 ANXIETY: ICD-10-CM

## 2023-11-07 DIAGNOSIS — I07.1 TRICUSPID VALVE INSUFFICIENCY, UNSPECIFIED ETIOLOGY: ICD-10-CM

## 2023-11-07 PROCEDURE — 99213 OFFICE O/P EST LOW 20 MIN: CPT | Performed by: NURSE PRACTITIONER

## 2023-11-07 PROCEDURE — 3074F SYST BP LT 130 MM HG: CPT | Performed by: NURSE PRACTITIONER

## 2023-11-07 PROCEDURE — 3078F DIAST BP <80 MM HG: CPT | Performed by: NURSE PRACTITIONER

## 2023-11-07 ASSESSMENT — FIBROSIS 4 INDEX: FIB4 SCORE: 2.02

## 2023-11-07 NOTE — PROGRESS NOTES
Chief Complaint   Patient presents with    Follow-Up       Subjective:     HPI:     Елена Perla is a 88 y.o. female here to discuss the following:    Patient presents today as she would like to further discuss her cardiology referral that was placed at her last office visit.  Patient states that she has been doing some thinking and has had increased levels of anxiety and stress surrounding having consultation with cardiology.   She is not having any chest pain, shortness of breath or dizziness.  No leg swelling.   States that she had no further episodes since her last office visit.  She remains very active as she is still very active in ballroom dancing and chores around the house.  Her son does have a cancer which does concern her and feels as if this is increasing her anxiety and stress levels.  She is made very clear that she does not want to wear a monitor on her heart or go through any particular cardiac test including a stress test.  She has had some reservations about this ever since her late  went through a cardiology work-up.    ROS: : see above        Current Outpatient Medications:     potassium chloride ER (KLOR-CON) 10 MEQ tablet, Take 20 mEq by mouth 3 times a day., Disp: , Rfl:     hydroCHLOROthiazide (HYDRODIURIL) 25 MG Tab, Take 1 Tablet by mouth every day., Disp: 30 Tablet, Rfl: 11    metoprolol tartrate (LOPRESSOR) 50 MG Tab, Take 1 Tablet by mouth 2 times a day., Disp: 60 Tablet, Rfl: 11    lisinopril (PRINIVIL) 40 MG tablet, Take 1 Tablet by mouth every day., Disp: 30 Tablet, Rfl: 11    amLODIPine (NORVASC) 10 MG Tab, Take 1 tablet by mouth once daily, Disp: 30 Tablet, Rfl: 11    propranolol (INDERAL) 40 MG Tab, Take 1 Tablet by mouth every day., Disp: 30 Tablet, Rfl: 11    amoxicillin (AMOXIL) 500 MG Cap, Take 1 Capsule by mouth 2 times a day., Disp: 20 Capsule, Rfl: 0    Multiple Vitamins-Minerals (PRESERVISION AREDS PO), Take  by mouth., Disp: , Rfl:     VITAMIN A PO, Take  by  "mouth., Disp: , Rfl:     therapeutic multivitamin-minerals (THERAGRAN-M) TABS, Take 1 Tab by mouth every day., Disp: , Rfl:     Calcium Carb-Cholecalciferol (CALCIUM + D3) 600-200 MG-UNIT TABS, Take  by mouth. 5 daily, Disp: , Rfl:     Magnesium 250 MG TABS, Take  by mouth every day., Disp: , Rfl:     Cyanocobalamin (VITAMIN B-12) 5000 MCG SUBL, Place  under tongue every day., Disp: , Rfl:     Garlic 1000 MG CAPS, Take  by mouth every day., Disp: , Rfl:     Cholecalciferol (VITAMIN D3) 5000 UNITS CAPS, Take 1 Cap by mouth every day., Disp: , Rfl:     Omega-3 Fatty Acids (FISH OIL) 1200 MG CAPS, Take  by mouth. 5 caps daily, Disp: , Rfl:     Allergies   Allergen Reactions    Other Misc      percodan    Other reaction(s): dizziness, vomiting    Percodan  [Kdc:Yellow Dye+Aspirin+Oxycodone] Vomiting    Percodan-Varsha  [Oxycodone-Aspirin] Vomiting     Other reaction(s): Dizziness       Objective:     Vitals: /70   Pulse 68   Temp 36.4 °C (97.6 °F)   Resp 16   Ht 1.651 m (5' 5\")   Wt 57.2 kg (126 lb)   SpO2 98%   BMI 20.97 kg/m²    General: Alert, pleasant, NAD  HEENT: Normocephalic.  Neck supple.   Respiratory: no distress, no audible wheezing, RR -WNL  Skin: Warm, dry, no rashes.  Extremities: No leg edema. No discoloration  Neurological: No tremors  Psych:  Affect/mood is normal, judgement is good, memory is intact, grooming is appropriate.    Assessment/Plan:      1. Anxiety    2. Heart palpitations    3. Moderate aortic insufficiency    4. Tricuspid valve insufficiency, unspecified etiology    At this time patient would like to hold scheduling with cardiology.  She believes her symptoms that she was experiencing on her last office visit were secondary to increased stressors and anxiety and lack of sleep.  At this time she remains very active and denies any chest pain, shortness of breath, dizziness, lightheadedness or syncopal episodes.  We have had a very long discussion regarding this and at this time " she will defer further cardiology evaluation.  She will follow back up if any symptoms arise.  She will keep her follow-up appointment that she has in January.    Miya MCPHERSON.

## 2023-11-10 ENCOUNTER — TELEPHONE (OUTPATIENT)
Dept: CARDIOLOGY | Facility: MEDICAL CENTER | Age: 88
End: 2023-11-10
Payer: MEDICARE

## 2023-11-10 NOTE — TELEPHONE ENCOUNTER
Referral from: Miya MARIEE for mod MR/AI, mild-mod TR    Patient called on 11/10/2023. She does not wish to schedule with cardiology at this time and requests we close the referral.

## 2023-12-27 ENCOUNTER — HOSPITAL ENCOUNTER (OUTPATIENT)
Dept: RADIOLOGY | Facility: MEDICAL CENTER | Age: 88
End: 2023-12-27
Attending: PHYSICAL MEDICINE & REHABILITATION
Payer: MEDICARE

## 2023-12-27 DIAGNOSIS — M54.16 LUMBAR RADICULOPATHY: ICD-10-CM

## 2023-12-27 PROCEDURE — 72148 MRI LUMBAR SPINE W/O DYE: CPT

## 2024-01-12 ENCOUNTER — HOSPITAL ENCOUNTER (OUTPATIENT)
Dept: LAB | Facility: MEDICAL CENTER | Age: 89
End: 2024-01-12
Attending: NURSE PRACTITIONER
Payer: MEDICARE

## 2024-01-12 DIAGNOSIS — E78.5 DYSLIPIDEMIA: Chronic | ICD-10-CM

## 2024-01-12 DIAGNOSIS — R73.03 PRE-DIABETES: Chronic | ICD-10-CM

## 2024-01-12 DIAGNOSIS — I10 ESSENTIAL HYPERTENSION: Chronic | ICD-10-CM

## 2024-01-12 LAB
ALBUMIN SERPL BCP-MCNC: 4.2 G/DL (ref 3.2–4.9)
ALBUMIN/GLOB SERPL: 1.2 G/DL
ALP SERPL-CCNC: 82 U/L (ref 30–99)
ALT SERPL-CCNC: 15 U/L (ref 2–50)
ANION GAP SERPL CALC-SCNC: 13 MMOL/L (ref 7–16)
AST SERPL-CCNC: 22 U/L (ref 12–45)
BILIRUB SERPL-MCNC: 0.5 MG/DL (ref 0.1–1.5)
BUN SERPL-MCNC: 20 MG/DL (ref 8–22)
CALCIUM ALBUM COR SERPL-MCNC: 9.3 MG/DL (ref 8.5–10.5)
CALCIUM SERPL-MCNC: 9.5 MG/DL (ref 8.5–10.5)
CHLORIDE SERPL-SCNC: 100 MMOL/L (ref 96–112)
CHOLEST SERPL-MCNC: 174 MG/DL (ref 100–199)
CO2 SERPL-SCNC: 26 MMOL/L (ref 20–33)
CREAT SERPL-MCNC: 0.69 MG/DL (ref 0.5–1.4)
CREAT UR-MCNC: 59.77 MG/DL
EST. AVERAGE GLUCOSE BLD GHB EST-MCNC: 103 MG/DL
FASTING STATUS PATIENT QL REPORTED: NORMAL
GFR SERPLBLD CREATININE-BSD FMLA CKD-EPI: 83 ML/MIN/1.73 M 2
GLOBULIN SER CALC-MCNC: 3.5 G/DL (ref 1.9–3.5)
GLUCOSE SERPL-MCNC: 103 MG/DL (ref 65–99)
HBA1C MFR BLD: 5.2 % (ref 4–5.6)
HDLC SERPL-MCNC: 58 MG/DL
LDLC SERPL CALC-MCNC: 88 MG/DL
MICROALBUMIN UR-MCNC: 1.7 MG/DL
MICROALBUMIN/CREAT UR: 28 MG/G (ref 0–30)
POTASSIUM SERPL-SCNC: 3.9 MMOL/L (ref 3.6–5.5)
PROT SERPL-MCNC: 7.7 G/DL (ref 6–8.2)
SODIUM SERPL-SCNC: 139 MMOL/L (ref 135–145)
TRIGL SERPL-MCNC: 139 MG/DL (ref 0–149)
TSH SERPL DL<=0.005 MIU/L-ACNC: 3.06 UIU/ML (ref 0.38–5.33)

## 2024-01-12 PROCEDURE — 83036 HEMOGLOBIN GLYCOSYLATED A1C: CPT

## 2024-01-12 PROCEDURE — 36415 COLL VENOUS BLD VENIPUNCTURE: CPT

## 2024-01-12 PROCEDURE — 82570 ASSAY OF URINE CREATININE: CPT

## 2024-01-12 PROCEDURE — 80061 LIPID PANEL: CPT

## 2024-01-12 PROCEDURE — 84443 ASSAY THYROID STIM HORMONE: CPT

## 2024-01-12 PROCEDURE — 80053 COMPREHEN METABOLIC PANEL: CPT

## 2024-01-12 PROCEDURE — 82043 UR ALBUMIN QUANTITATIVE: CPT

## 2024-01-17 ENCOUNTER — OFFICE VISIT (OUTPATIENT)
Dept: MEDICAL GROUP | Facility: MEDICAL CENTER | Age: 89
End: 2024-01-17
Payer: MEDICARE

## 2024-01-17 VITALS
TEMPERATURE: 97.2 F | SYSTOLIC BLOOD PRESSURE: 120 MMHG | HEIGHT: 65 IN | WEIGHT: 128 LBS | RESPIRATION RATE: 16 BRPM | BODY MASS INDEX: 21.33 KG/M2 | OXYGEN SATURATION: 97 % | DIASTOLIC BLOOD PRESSURE: 72 MMHG | HEART RATE: 62 BPM

## 2024-01-17 DIAGNOSIS — M54.17 LUMBOSACRAL RADICULOPATHY: ICD-10-CM

## 2024-01-17 DIAGNOSIS — R73.03 PRE-DIABETES: Chronic | ICD-10-CM

## 2024-01-17 DIAGNOSIS — I10 ESSENTIAL HYPERTENSION: ICD-10-CM

## 2024-01-17 DIAGNOSIS — E78.5 DYSLIPIDEMIA: ICD-10-CM

## 2024-01-17 DIAGNOSIS — G89.29 CHRONIC BILATERAL LOW BACK PAIN WITH BILATERAL SCIATICA: Chronic | ICD-10-CM

## 2024-01-17 DIAGNOSIS — E87.6 HYPOKALEMIA: Chronic | ICD-10-CM

## 2024-01-17 DIAGNOSIS — M54.41 CHRONIC BILATERAL LOW BACK PAIN WITH BILATERAL SCIATICA: Chronic | ICD-10-CM

## 2024-01-17 DIAGNOSIS — M54.42 CHRONIC BILATERAL LOW BACK PAIN WITH BILATERAL SCIATICA: Chronic | ICD-10-CM

## 2024-01-17 PROCEDURE — 3078F DIAST BP <80 MM HG: CPT | Performed by: NURSE PRACTITIONER

## 2024-01-17 PROCEDURE — 99214 OFFICE O/P EST MOD 30 MIN: CPT | Performed by: NURSE PRACTITIONER

## 2024-01-17 PROCEDURE — 3074F SYST BP LT 130 MM HG: CPT | Performed by: NURSE PRACTITIONER

## 2024-01-17 RX ORDER — KETOROLAC TROMETHAMINE 5 MG/ML
SOLUTION OPHTHALMIC
COMMUNITY
Start: 2024-01-15

## 2024-01-17 ASSESSMENT — FIBROSIS 4 INDEX: FIB4 SCORE: 2.55

## 2024-01-17 ASSESSMENT — PATIENT HEALTH QUESTIONNAIRE - PHQ9: CLINICAL INTERPRETATION OF PHQ2 SCORE: 0

## 2024-01-17 NOTE — PROGRESS NOTES
"Subjective:     HPI:     Елена Perla is a 88 y.o. female  presents to discuss:   Chief Complaint   Patient presents with    Follow-Up     Presents today to follow-up.  Review blood work.  Review recent MRI from pain management.    Had MRI completed 12/27/2023 and \"epidural #16\" on 1/11/2024.   Followed by Dr. Marino for this. Still very active.       ROS: : see above      Current Outpatient Medications:     ketorolac (ACULAR) 0.5 % Solution, , Disp: , Rfl:     potassium chloride ER (KLOR-CON) 10 MEQ tablet, Take 20 mEq by mouth 3 times a day., Disp: , Rfl:     hydroCHLOROthiazide (HYDRODIURIL) 25 MG Tab, Take 1 Tablet by mouth every day., Disp: 30 Tablet, Rfl: 11    metoprolol tartrate (LOPRESSOR) 50 MG Tab, Take 1 Tablet by mouth 2 times a day., Disp: 60 Tablet, Rfl: 11    lisinopril (PRINIVIL) 40 MG tablet, Take 1 Tablet by mouth every day., Disp: 30 Tablet, Rfl: 11    amLODIPine (NORVASC) 10 MG Tab, Take 1 tablet by mouth once daily, Disp: 30 Tablet, Rfl: 11    propranolol (INDERAL) 40 MG Tab, Take 1 Tablet by mouth every day., Disp: 30 Tablet, Rfl: 11    amoxicillin (AMOXIL) 500 MG Cap, Take 1 Capsule by mouth 2 times a day., Disp: 20 Capsule, Rfl: 0    Multiple Vitamins-Minerals (PRESERVISION AREDS PO), Take  by mouth., Disp: , Rfl:     VITAMIN A PO, Take  by mouth., Disp: , Rfl:     therapeutic multivitamin-minerals (THERAGRAN-M) TABS, Take 1 Tab by mouth every day., Disp: , Rfl:     Calcium Carb-Cholecalciferol (CALCIUM + D3) 600-200 MG-UNIT TABS, Take  by mouth. 5 daily, Disp: , Rfl:     Magnesium 250 MG TABS, Take  by mouth every day., Disp: , Rfl:     Cyanocobalamin (VITAMIN B-12) 5000 MCG SUBL, Place  under tongue every day., Disp: , Rfl:     Garlic 1000 MG CAPS, Take  by mouth every day., Disp: , Rfl:     Cholecalciferol (VITAMIN D3) 5000 UNITS CAPS, Take 1 Cap by mouth every day., Disp: , Rfl:     Omega-3 Fatty Acids (FISH OIL) 1200 MG CAPS, Take  by mouth. 5 caps daily, Disp: , Rfl:     Allergies " "  Allergen Reactions    Other Misc      percodan    Other reaction(s): dizziness, vomiting    Percodan  [Kdc:Yellow Dye+Aspirin+Oxycodone] Vomiting    Percodan-Varsha  [Oxycodone-Aspirin] Vomiting     Other reaction(s): Dizziness       Objective:     Vitals: /72   Pulse 62   Temp 36.2 °C (97.2 °F)   Resp 16   Ht 1.651 m (5' 5\")   Wt 58.1 kg (128 lb)   SpO2 97%   BMI 21.30 kg/m²    General: Alert, pleasant, NAD  HEENT: Normocephalic.  Neck supple.   Respiratory: no distress, no audible wheezing, RR -WNL  Skin: Warm, dry, no rashes.  Extremities: No leg edema. No discoloration  Neurological: No tremors  Psych:  Affect/mood is normal, judgement is good, memory is intact, grooming is appropriate.    Assessment/Plan:      1. Essential hypertension  Chronic, multi drug therapy.  Compliant with medications.  Denies any shortness of breath, dizziness, headaches.  Continue HCTZ, metoprolol, lisinopril and amlodipine.    2. Dyslipidemia  Chronic-not on statin therapy.  Most recent lipid panel stable.  Continue lifestyle modification.      3. Hypokalemia  Chronic, most recent potassium level stable.  Secondary to diuretic use.  Continue potassium supplement.    4. Pre-diabetes  A1c stable.  Continue to monitor at least annually.    5. Chronic bilateral low back pain with bilateral sciatica  6. Lumbosacral radiculopathy  Chronic.  Have reviewed most recent MRI findings-appears stable.  Not interested in any surgeries and will continue to receive epidurals.  Followed by pain management for this.    Other orders  - ketorolac (ACULAR) 0.5 % Solution      Return in about 4 months (around 5/17/2024) for Blood Pressure.          Miya CASTAÑEDA    "

## 2024-03-22 ENCOUNTER — TELEPHONE (OUTPATIENT)
Dept: HEALTH INFORMATION MANAGEMENT | Facility: OTHER | Age: 89
End: 2024-03-22
Payer: MEDICARE

## 2024-05-15 ENCOUNTER — OFFICE VISIT (OUTPATIENT)
Dept: MEDICAL GROUP | Facility: MEDICAL CENTER | Age: 89
End: 2024-05-15
Payer: MEDICARE

## 2024-05-15 VITALS
DIASTOLIC BLOOD PRESSURE: 72 MMHG | HEIGHT: 65 IN | RESPIRATION RATE: 16 BRPM | SYSTOLIC BLOOD PRESSURE: 124 MMHG | TEMPERATURE: 97.8 F | OXYGEN SATURATION: 95 % | BODY MASS INDEX: 21.66 KG/M2 | HEART RATE: 68 BPM | WEIGHT: 130 LBS

## 2024-05-15 DIAGNOSIS — M54.41 CHRONIC BILATERAL LOW BACK PAIN WITH BILATERAL SCIATICA: ICD-10-CM

## 2024-05-15 DIAGNOSIS — I10 ESSENTIAL HYPERTENSION: ICD-10-CM

## 2024-05-15 DIAGNOSIS — G89.29 CHRONIC BILATERAL LOW BACK PAIN WITH BILATERAL SCIATICA: ICD-10-CM

## 2024-05-15 DIAGNOSIS — M54.42 CHRONIC BILATERAL LOW BACK PAIN WITH BILATERAL SCIATICA: ICD-10-CM

## 2024-05-15 DIAGNOSIS — G25.0 ESSENTIAL TREMOR: ICD-10-CM

## 2024-05-15 DIAGNOSIS — F43.22 ADJUSTMENT DISORDER WITH ANXIOUS MOOD: ICD-10-CM

## 2024-05-15 DIAGNOSIS — M54.17 LUMBOSACRAL RADICULOPATHY: ICD-10-CM

## 2024-05-15 PROCEDURE — 3078F DIAST BP <80 MM HG: CPT | Performed by: NURSE PRACTITIONER

## 2024-05-15 PROCEDURE — 99214 OFFICE O/P EST MOD 30 MIN: CPT | Performed by: NURSE PRACTITIONER

## 2024-05-15 PROCEDURE — 3074F SYST BP LT 130 MM HG: CPT | Performed by: NURSE PRACTITIONER

## 2024-05-15 RX ORDER — PROPRANOLOL HYDROCHLORIDE 40 MG/1
40 TABLET ORAL DAILY
Qty: 30 TABLET | Refills: 11 | Status: SHIPPED | OUTPATIENT
Start: 2024-05-15

## 2024-05-15 ASSESSMENT — FIBROSIS 4 INDEX: FIB4 SCORE: 2.55

## 2024-05-15 NOTE — PROGRESS NOTES
Subjective:     HPI:     Елена Perla is a 88 y.o. female presents to discuss:   Chief Complaint   Patient presents with    Follow-Up     Follow up    She has been working in the yard.   She does have chronic back pain-but likes to stay active.   Followed by pain management for this.     Not able to get Propranolol from [facility]-has to get from [facility]-she would like to have her Rx sent to [facility] today to see if they have the supply restocked. ?start on sertraline- just in case [facility] does not have Propranolol.     ROS: : see above      Current Outpatient Medications:     propranolol (INDERAL) 40 MG Tab, Take 1 Tablet by mouth every day., Disp: 30 Tablet, Rfl: 11    ketorolac (ACULAR) 0.5 % Solution, , Disp: , Rfl:     potassium chloride ER (KLOR-CON) 10 MEQ tablet, Take 20 mEq by mouth 3 times a day., Disp: , Rfl:     hydroCHLOROthiazide (HYDRODIURIL) 25 MG Tab, Take 1 Tablet by mouth every day., Disp: 30 Tablet, Rfl: 11    metoprolol tartrate (LOPRESSOR) 50 MG Tab, Take 1 Tablet by mouth 2 times a day., Disp: 60 Tablet, Rfl: 11    lisinopril (PRINIVIL) 40 MG tablet, Take 1 Tablet by mouth every day., Disp: 30 Tablet, Rfl: 11    amLODIPine (NORVASC) 10 MG Tab, Take 1 tablet by mouth once daily, Disp: 30 Tablet, Rfl: 11    amoxicillin (AMOXIL) 500 MG Cap, Take 1 Capsule by mouth 2 times a day., Disp: 20 Capsule, Rfl: 0    Multiple Vitamins-Minerals (PRESERVISION AREDS PO), Take  by mouth., Disp: , Rfl:     VITAMIN A PO, Take  by mouth., Disp: , Rfl:     therapeutic multivitamin-minerals (THERAGRAN-M) TABS, Take 1 Tab by mouth every day., Disp: , Rfl:     Calcium Carb-Cholecalciferol (CALCIUM + D3) 600-200 MG-UNIT TABS, Take  by mouth. 5 daily, Disp: , Rfl:     Magnesium 250 MG TABS, Take  by mouth every day., Disp: , Rfl:     Cyanocobalamin (VITAMIN B-12) 5000 MCG SUBL, Place  under tongue every day., Disp: , Rfl:     Garlic 1000 MG CAPS, Take  by mouth every day., Disp: , Rfl:     Cholecalciferol (VITAMIN D3)  "5000 UNITS CAPS, Take 1 Cap by mouth every day., Disp: , Rfl:     Omega-3 Fatty Acids (FISH OIL) 1200 MG CAPS, Take  by mouth. 5 caps daily, Disp: , Rfl:     Allergies   Allergen Reactions    Other Misc      percodan    Other reaction(s): dizziness, vomiting    Percodan  [Kdc:Yellow Dye+Aspirin+Oxycodone] Vomiting    Percodan-Varsha  [Oxycodone-Aspirin] Vomiting     Other reaction(s): Dizziness       Objective:     Vitals: /72   Pulse 68   Temp 36.6 °C (97.8 °F)   Resp 16   Ht 1.651 m (5' 5\")   Wt 59 kg (130 lb)   SpO2 95%   BMI 21.63 kg/m²      General: Alert, pleasant, NAD  HEENT: Normocephalic.  Neck supple.   Respiratory: no distress, no audible wheezing, RR -WNL  Skin: Warm, dry, no rashes.  Extremities: No leg edema. No discoloration  Neurological: No tremors  Psych:  Affect/mood is normal, judgement is good, memory is intact, grooming is appropriate.    Assessment/Plan:      1. Essential hypertension  Chronic, multi drug therapy.  Compliant with medications.  Denies any shortness of breath, dizziness, headaches.  Continue HCTZ, metoprolol, lisinopril and amlodipine.     2. Chronic bilateral low back pain with bilateral sciatica  3. Lumbosacral radiculopathy  Chronic.  Stable. She stays very active, working in the yard.  Not interested in any surgeries and will continue to receive epidurals.  Followed by pain management for this.     4. Adjustment disorder with anxious mood  Chronic. Improved with Propranolol.  However her local [facility] apparently is not able to keep in stock which is inconvenient for her.   May consider starting on Sertraline.  She will notify via phone and I will set reminder for myself to reach out in one week to follow up.  - propranolol (INDERAL) 40 MG Tab; Take 1 Tablet by mouth every day.  Dispense: 30 Tablet; Refill: 11    5. Essential tremor  Chronic. Improved with Propranolol.   - propranolol (INDERAL) 40 MG Tab; Take 1 Tablet by mouth every day.  Dispense: 30 Tablet; " Refill: 11       No problem-specific Assessment & Plan notes found for this encounter.       Return in about 4 months (around 9/15/2024) for renew medications..    {I have placed the above orders and discussed them with an approved delegating provider. The MA is performing the below orders under the direction of Dr. Tyshawn CASTAÑEDA

## 2024-06-13 DIAGNOSIS — I10 ESSENTIAL HYPERTENSION: ICD-10-CM

## 2024-06-13 RX ORDER — AMLODIPINE BESYLATE 10 MG/1
TABLET ORAL
Qty: 30 TABLET | Refills: 0 | OUTPATIENT
Start: 2024-06-13

## 2024-06-13 RX ORDER — AMLODIPINE BESYLATE 10 MG/1
10 TABLET ORAL DAILY
Qty: 30 TABLET | Refills: 11 | Status: SHIPPED | OUTPATIENT
Start: 2024-06-13

## 2024-06-13 NOTE — TELEPHONE ENCOUNTER
Received request via: Pharmacy    Was the patient seen in the last year in this department? Yes    Does the patient have an active prescription (recently filled or refills available) for medication(s) requested? No    Pharmacy Name: [id number] - [place], [place] - [address]     Does the patient have alf Plus and need 100 day supply (blood pressure, diabetes and cholesterol meds only)? Yes, quantity updated to 100 days

## 2024-09-12 DIAGNOSIS — I10 ESSENTIAL HYPERTENSION: Chronic | ICD-10-CM

## 2024-09-16 RX ORDER — POTASSIUM CHLORIDE 750 MG/1
20 TABLET, EXTENDED RELEASE ORAL 3 TIMES DAILY
Qty: 180 TABLET | Refills: 11 | Status: SHIPPED | OUTPATIENT
Start: 2024-09-16

## 2024-09-16 RX ORDER — HYDROCHLOROTHIAZIDE 25 MG/1
25 TABLET ORAL DAILY
Qty: 30 TABLET | Refills: 11 | Status: SHIPPED | OUTPATIENT
Start: 2024-09-16

## 2024-09-16 RX ORDER — METOPROLOL TARTRATE 50 MG
50 TABLET ORAL 2 TIMES DAILY
Qty: 60 TABLET | Refills: 11 | Status: SHIPPED | OUTPATIENT
Start: 2024-09-16

## 2024-09-16 RX ORDER — LISINOPRIL 40 MG/1
40 TABLET ORAL DAILY
Qty: 30 TABLET | Refills: 11 | Status: SHIPPED | OUTPATIENT
Start: 2024-09-16

## 2024-11-14 DIAGNOSIS — I10 ESSENTIAL HYPERTENSION: Chronic | ICD-10-CM

## 2024-11-14 RX ORDER — LISINOPRIL 40 MG/1
40 TABLET ORAL DAILY
Qty: 100 TABLET | Refills: 1 | Status: SHIPPED | OUTPATIENT
Start: 2024-11-14

## 2024-11-14 NOTE — TELEPHONE ENCOUNTER
Refill X 6 months, sent to pharmacy.Pt. Seen in the last 6 months per protocol.   Lab Results   Component Value Date/Time    SODIUM 139 01/12/2024 07:33 AM    POTASSIUM 3.9 01/12/2024 07:33 AM    CHLORIDE 100 01/12/2024 07:33 AM    CO2 26 01/12/2024 07:33 AM    GLUCOSE 103 (H) 01/12/2024 07:33 AM    BUN 20 01/12/2024 07:33 AM    CREATININE 0.69 01/12/2024 07:33 AM       Therese Arnold, Clinical Pharmacist, CDE, CACP  Managed Care Pharmacist for [facility] and [facility]

## 2024-11-21 ENCOUNTER — OFFICE VISIT (OUTPATIENT)
Dept: MEDICAL GROUP | Facility: MEDICAL CENTER | Age: 89
End: 2024-11-21
Payer: MEDICARE

## 2024-11-21 VITALS
HEIGHT: 65 IN | DIASTOLIC BLOOD PRESSURE: 74 MMHG | OXYGEN SATURATION: 94 % | SYSTOLIC BLOOD PRESSURE: 122 MMHG | BODY MASS INDEX: 20.49 KG/M2 | WEIGHT: 123 LBS | HEART RATE: 64 BPM | TEMPERATURE: 97 F | RESPIRATION RATE: 16 BRPM

## 2024-11-21 DIAGNOSIS — M54.42 CHRONIC BILATERAL LOW BACK PAIN WITH BILATERAL SCIATICA: ICD-10-CM

## 2024-11-21 DIAGNOSIS — H35.3230 BILATERAL EXUDATIVE AGE-RELATED MACULAR DEGENERATION, UNSPECIFIED STAGE (HCC): Chronic | ICD-10-CM

## 2024-11-21 DIAGNOSIS — G25.0 ESSENTIAL TREMOR: ICD-10-CM

## 2024-11-21 DIAGNOSIS — I07.1 TRICUSPID VALVE INSUFFICIENCY, UNSPECIFIED ETIOLOGY: ICD-10-CM

## 2024-11-21 DIAGNOSIS — M54.41 CHRONIC BILATERAL LOW BACK PAIN WITH BILATERAL SCIATICA: ICD-10-CM

## 2024-11-21 DIAGNOSIS — R73.03 PRE-DIABETES: ICD-10-CM

## 2024-11-21 DIAGNOSIS — I10 ESSENTIAL HYPERTENSION: ICD-10-CM

## 2024-11-21 DIAGNOSIS — E78.5 DYSLIPIDEMIA: ICD-10-CM

## 2024-11-21 DIAGNOSIS — I35.1 MODERATE AORTIC INSUFFICIENCY: ICD-10-CM

## 2024-11-21 DIAGNOSIS — E87.6 HYPOKALEMIA: ICD-10-CM

## 2024-11-21 DIAGNOSIS — F43.22 ADJUSTMENT DISORDER WITH ANXIOUS MOOD: ICD-10-CM

## 2024-11-21 DIAGNOSIS — G89.29 CHRONIC BILATERAL LOW BACK PAIN WITH BILATERAL SCIATICA: ICD-10-CM

## 2024-11-21 DIAGNOSIS — M54.17 LUMBOSACRAL RADICULOPATHY: ICD-10-CM

## 2024-11-21 PROCEDURE — 3078F DIAST BP <80 MM HG: CPT | Performed by: NURSE PRACTITIONER

## 2024-11-21 PROCEDURE — 99214 OFFICE O/P EST MOD 30 MIN: CPT | Performed by: NURSE PRACTITIONER

## 2024-11-21 PROCEDURE — 3074F SYST BP LT 130 MM HG: CPT | Performed by: NURSE PRACTITIONER

## 2024-11-21 ASSESSMENT — FIBROSIS 4 INDEX: FIB4 SCORE: 2.58

## 2024-11-21 NOTE — PROGRESS NOTES
Subjective:     Елена Perla is a 89 y.o. female presents to discuss:   Chief Complaint   Patient presents with    Follow-Up     Verbal consent was acquired by the patient to use [employer] ambient listening note generation during this visit Yes     History of Present Illness          ROS: : see above      Current Outpatient Medications:     lisinopril (PRINIVIL) 40 MG tablet, Take 1 Tablet by mouth every day., Disp: 100 Tablet, Rfl: 1    hydroCHLOROthiazide 25 MG Tab, Take 1 tablet by mouth once daily, Disp: 30 Tablet, Rfl: 11    metoprolol tartrate (LOPRESSOR) 50 MG Tab, Take 1 tablet by mouth twice daily, Disp: 60 Tablet, Rfl: 11    potassium chloride ER (KLOR-CON) 10 MEQ tablet, TAKE 2 TABLETS BY MOUTH THREE TIMES DAILY, Disp: 180 Tablet, Rfl: 11    amLODIPine (NORVASC) 10 MG Tab, Take 1 Tablet by mouth every day., Disp: 30 Tablet, Rfl: 11    propranolol (INDERAL) 40 MG Tab, Take 1 Tablet by mouth every day., Disp: 30 Tablet, Rfl: 11    ketorolac (ACULAR) 0.5 % Solution, , Disp: , Rfl:     amoxicillin (AMOXIL) 500 MG Cap, Take 1 Capsule by mouth 2 times a day., Disp: 20 Capsule, Rfl: 0    Multiple Vitamins-Minerals (PRESERVISION AREDS PO), Take  by mouth., Disp: , Rfl:     VITAMIN A PO, Take  by mouth., Disp: , Rfl:     therapeutic multivitamin-minerals (THERAGRAN-M) TABS, Take 1 Tab by mouth every day., Disp: , Rfl:     Calcium Carb-Cholecalciferol (CALCIUM + D3) 600-200 MG-UNIT TABS, Take  by mouth. 5 daily, Disp: , Rfl:     Magnesium 250 MG TABS, Take  by mouth every day., Disp: , Rfl:     Cyanocobalamin (VITAMIN B-12) 5000 MCG SUBL, Place  under tongue every day., Disp: , Rfl:     Garlic 1000 MG CAPS, Take  by mouth every day., Disp: , Rfl:     Cholecalciferol (VITAMIN D3) 5000 UNITS CAPS, Take 1 Cap by mouth every day., Disp: , Rfl:     Omega-3 Fatty Acids (FISH OIL) 1200 MG CAPS, Take  by mouth. 5 caps daily, Disp: , Rfl:     Allergies   Allergen Reactions    Other Misc      percodan    Other  "reaction(s): dizziness, vomiting    Percodan  [Kdc:Yellow Dye+Aspirin+Oxycodone] Vomiting    Percodan-Varsha  [Oxycodone-Aspirin] Vomiting     Other reaction(s): Dizziness       Objective:     Vitals: /74   Pulse 64   Temp 36.1 °C (97 °F)   Resp 16   Ht 1.651 m (5' 5\")   Wt 55.8 kg (123 lb)   SpO2 94%   Breastfeeding No   BMI 20.47 kg/m²    General: Alert, pleasant, NAD  HEENT: Normocephalic.  Neck supple.   Respiratory: no distress, no audible wheezing, RR -WNL  Skin: Warm, dry, no rashes.  Extremities: No leg edema. No discoloration  Neurological: No tremors  Psych:  Affect/mood is normal, judgement is good, memory is intact, grooming is appropriate.      Assessment/Plan:      Assessment & Plan      1. Essential hypertension    2. Chronic bilateral low back pain with bilateral sciatica    3. Lumbosacral radiculopathy    4. Adjustment disorder with anxious mood    5. Essential tremor  - CBC WITHOUT DIFFERENTIAL; Future    6. Dyslipidemia  - Lipid Profile; Future  - TSH; Future    7. Hypokalemia  - Comp Metabolic Panel; Future  - MICROALBUMIN CREAT RATIO URINE; Future    8. Pre-diabetes  - HEMOGLOBIN A1C; Future    9. Moderate aortic insufficiency  - CBC WITHOUT DIFFERENTIAL; Future    10. Tricuspid valve insufficiency, unspecified etiology  - CBC WITHOUT DIFFERENTIAL; Future    11. Bilateral exudative age-related macular degeneration, unspecified stage (HCC)       Return in about 4 months (around 3/21/2025).    {I have placed the above orders and discussed them with an approved delegating provider. The MA is performing the below orders under the direction of Dr. Tyshawn Xiong ARonaPRonaRRonaN.    " of balance due to her left leg but manages well at home. A referral for physical therapy to improve balance and strength was suggested, but she declined. She is advised to be cautious and consider physical therapy if her balance issues worsen.    3. Macular Degeneration.  She visited Dr. Yan for a complete eye exam, and no worsening of her condition was noted. She will continue with eye injections three to four times a year. Her next appointments are scheduled for January 7 and January 28, 2025.    4. Medication Management.  She prefers 30-day medication refills over 90-day supplies. No new medications or refills are required at this time.    5. Health Maintenance.  She received her flu shot approximately three weeks ago. Blood work has been ordered for her to complete in January 2025.    Follow-up  Return in 4 months for follow up.    1. Essential hypertension  Chronic. Stable. Denies CP, SOB, dizziness.  Continue lisinopril, hctz, metoprolol, amlodipine.    2. Chronic bilateral low back pain with bilateral sciatica  3. Lumbosacral radiculopathy  Followed by pain management.     4. Adjustment disorder with anxious mood    5. Essential tremor  - CBC WITHOUT DIFFERENTIAL; Future    6. Dyslipidemia  - Lipid Profile; Future  - TSH; Future    7. Hypokalemia  - Comp Metabolic Panel; Future  - MICROALBUMIN CREAT RATIO URINE; Future    8. Pre-diabetes  - HEMOGLOBIN A1C; Future    9. Moderate aortic insufficiency  - CBC WITHOUT DIFFERENTIAL; Future    10. Tricuspid valve insufficiency, unspecified etiology  - CBC WITHOUT DIFFERENTIAL; Future    11. Bilateral exudative age-related macular degeneration, unspecified stage (HCC)       Return in about 4 months (around 3/21/2025).    {I have placed the above orders and discussed them with an approved delegating provider. The MA is performing the below orders under the direction of Dr. Tyshawn CASTAÑEDA

## 2025-01-23 ENCOUNTER — HOSPITAL ENCOUNTER (OUTPATIENT)
Dept: LAB | Facility: MEDICAL CENTER | Age: OVER 89
End: 2025-01-23
Attending: NURSE PRACTITIONER
Payer: MEDICARE

## 2025-01-23 DIAGNOSIS — I07.1 TRICUSPID VALVE INSUFFICIENCY, UNSPECIFIED ETIOLOGY: ICD-10-CM

## 2025-01-23 DIAGNOSIS — I35.1 MODERATE AORTIC INSUFFICIENCY: ICD-10-CM

## 2025-01-23 DIAGNOSIS — E87.6 HYPOKALEMIA: ICD-10-CM

## 2025-01-23 DIAGNOSIS — G25.0 ESSENTIAL TREMOR: ICD-10-CM

## 2025-01-23 DIAGNOSIS — E78.5 DYSLIPIDEMIA: ICD-10-CM

## 2025-01-23 DIAGNOSIS — R73.03 PRE-DIABETES: ICD-10-CM

## 2025-01-23 LAB
ALBUMIN SERPL BCP-MCNC: 3.9 G/DL (ref 3.2–4.9)
ALBUMIN/GLOB SERPL: 1.1 G/DL
ALP SERPL-CCNC: 85 U/L (ref 30–99)
ALT SERPL-CCNC: 17 U/L (ref 2–50)
ANION GAP SERPL CALC-SCNC: 10 MMOL/L (ref 7–16)
AST SERPL-CCNC: 21 U/L (ref 12–45)
BILIRUB SERPL-MCNC: 0.4 MG/DL (ref 0.1–1.5)
BUN SERPL-MCNC: 20 MG/DL (ref 8–22)
CALCIUM ALBUM COR SERPL-MCNC: 9.7 MG/DL (ref 8.5–10.5)
CALCIUM SERPL-MCNC: 9.6 MG/DL (ref 8.5–10.5)
CHLORIDE SERPL-SCNC: 102 MMOL/L (ref 96–112)
CHOLEST SERPL-MCNC: 205 MG/DL (ref 100–199)
CO2 SERPL-SCNC: 28 MMOL/L (ref 20–33)
CREAT SERPL-MCNC: 0.8 MG/DL (ref 0.5–1.4)
CREAT UR-MCNC: 45.9 MG/DL
ERYTHROCYTE [DISTWIDTH] IN BLOOD BY AUTOMATED COUNT: 42.9 FL (ref 35.9–50)
EST. AVERAGE GLUCOSE BLD GHB EST-MCNC: 123 MG/DL
GFR SERPLBLD CREATININE-BSD FMLA CKD-EPI: 70 ML/MIN/1.73 M 2
GLOBULIN SER CALC-MCNC: 3.5 G/DL (ref 1.9–3.5)
GLUCOSE SERPL-MCNC: 95 MG/DL (ref 65–99)
HBA1C MFR BLD: 5.9 % (ref 4–5.6)
HCT VFR BLD AUTO: 41.3 % (ref 37–47)
HDLC SERPL-MCNC: 70 MG/DL
HGB BLD-MCNC: 14 G/DL (ref 12–16)
LDLC SERPL CALC-MCNC: 100 MG/DL
MCH RBC QN AUTO: 33.7 PG (ref 27–33)
MCHC RBC AUTO-ENTMCNC: 33.9 G/DL (ref 32.2–35.5)
MCV RBC AUTO: 99.3 FL (ref 81.4–97.8)
MICROALBUMIN UR-MCNC: 5.6 MG/DL
MICROALBUMIN/CREAT UR: 122 MG/G (ref 0–30)
PLATELET # BLD AUTO: 196 K/UL (ref 164–446)
PMV BLD AUTO: 9.8 FL (ref 9–12.9)
POTASSIUM SERPL-SCNC: 4.1 MMOL/L (ref 3.6–5.5)
PROT SERPL-MCNC: 7.4 G/DL (ref 6–8.2)
RBC # BLD AUTO: 4.16 M/UL (ref 4.2–5.4)
SODIUM SERPL-SCNC: 140 MMOL/L (ref 135–145)
TRIGL SERPL-MCNC: 177 MG/DL (ref 0–149)
TSH SERPL-ACNC: 3.8 UIU/ML (ref 0.35–5.5)
WBC # BLD AUTO: 8.3 K/UL (ref 4.8–10.8)

## 2025-01-23 PROCEDURE — 84443 ASSAY THYROID STIM HORMONE: CPT

## 2025-01-23 PROCEDURE — 82043 UR ALBUMIN QUANTITATIVE: CPT

## 2025-01-23 PROCEDURE — 80061 LIPID PANEL: CPT

## 2025-01-23 PROCEDURE — 83036 HEMOGLOBIN GLYCOSYLATED A1C: CPT

## 2025-01-23 PROCEDURE — 85027 COMPLETE CBC AUTOMATED: CPT

## 2025-01-23 PROCEDURE — 82570 ASSAY OF URINE CREATININE: CPT

## 2025-01-23 PROCEDURE — 36415 COLL VENOUS BLD VENIPUNCTURE: CPT

## 2025-01-23 PROCEDURE — 80053 COMPREHEN METABOLIC PANEL: CPT

## 2025-02-19 ENCOUNTER — TELEPHONE (OUTPATIENT)
Dept: HEALTH INFORMATION MANAGEMENT | Facility: OTHER | Age: OVER 89
End: 2025-02-19

## 2025-02-19 NOTE — TELEPHONE ENCOUNTER
1. Caller Name: Loes Remington                          Call Back Number: [phone] (home)         How would the patient prefer to be contacted with a response: Phone call do NOT leave a detailed message    Pt is requesting antibiotic for UTI sx sent to her pharmacy.  Pt informed will send message to her PCP.

## 2025-02-20 RX ORDER — AMOXICILLIN 500 MG/1
500 CAPSULE ORAL 2 TIMES DAILY
Qty: 20 CAPSULE | Refills: 0 | Status: SHIPPED | OUTPATIENT
Start: 2025-02-20

## 2025-02-20 NOTE — TELEPHONE ENCOUNTER
Pt informed of the antibiotic sent by her PCP Miya Xiong to [facility].  Pt advised to follow if sx persist in spite of taking antibiotic.  All was understood. No other concern on this call.

## 2025-03-19 ENCOUNTER — TELEPHONE (OUTPATIENT)
Dept: SCHEDULING | Facility: IMAGING CENTER | Age: OVER 89
End: 2025-03-19

## 2025-03-19 ENCOUNTER — APPOINTMENT (OUTPATIENT)
Dept: MEDICAL GROUP | Facility: MEDICAL CENTER | Age: OVER 89
End: 2025-03-19
Payer: MEDICARE

## 2025-03-21 ENCOUNTER — TELEPHONE (OUTPATIENT)
Dept: SCHEDULING | Facility: IMAGING CENTER | Age: OVER 89
End: 2025-03-21
Payer: MEDICARE

## 2025-03-25 ENCOUNTER — OFFICE VISIT (OUTPATIENT)
Dept: MEDICAL GROUP | Facility: MEDICAL CENTER | Age: OVER 89
End: 2025-03-25
Payer: MEDICARE

## 2025-03-25 VITALS
SYSTOLIC BLOOD PRESSURE: 120 MMHG | BODY MASS INDEX: 20.49 KG/M2 | HEIGHT: 65 IN | DIASTOLIC BLOOD PRESSURE: 68 MMHG | RESPIRATION RATE: 16 BRPM | WEIGHT: 123 LBS | OXYGEN SATURATION: 96 % | HEART RATE: 62 BPM | TEMPERATURE: 98.3 F

## 2025-03-25 DIAGNOSIS — F43.22 ADJUSTMENT DISORDER WITH ANXIOUS MOOD: ICD-10-CM

## 2025-03-25 DIAGNOSIS — M54.42 CHRONIC BILATERAL LOW BACK PAIN WITH BILATERAL SCIATICA: ICD-10-CM

## 2025-03-25 DIAGNOSIS — M54.17 LUMBOSACRAL RADICULOPATHY: ICD-10-CM

## 2025-03-25 DIAGNOSIS — M54.41 CHRONIC BILATERAL LOW BACK PAIN WITH BILATERAL SCIATICA: ICD-10-CM

## 2025-03-25 DIAGNOSIS — L98.9 SKIN LESION: ICD-10-CM

## 2025-03-25 DIAGNOSIS — G89.29 CHRONIC BILATERAL LOW BACK PAIN WITH BILATERAL SCIATICA: ICD-10-CM

## 2025-03-25 DIAGNOSIS — E87.6 HYPOKALEMIA: ICD-10-CM

## 2025-03-25 DIAGNOSIS — I10 ESSENTIAL HYPERTENSION: ICD-10-CM

## 2025-03-25 DIAGNOSIS — G25.0 ESSENTIAL TREMOR: ICD-10-CM

## 2025-03-25 PROCEDURE — 3078F DIAST BP <80 MM HG: CPT | Performed by: NURSE PRACTITIONER

## 2025-03-25 PROCEDURE — 99214 OFFICE O/P EST MOD 30 MIN: CPT | Performed by: NURSE PRACTITIONER

## 2025-03-25 PROCEDURE — 3074F SYST BP LT 130 MM HG: CPT | Performed by: NURSE PRACTITIONER

## 2025-03-25 RX ORDER — LISINOPRIL 40 MG/1
40 TABLET ORAL DAILY
Qty: 30 TABLET | Refills: 11 | Status: SHIPPED | OUTPATIENT
Start: 2025-03-25

## 2025-03-25 RX ORDER — AMLODIPINE BESYLATE 10 MG/1
10 TABLET ORAL DAILY
Qty: 30 TABLET | Refills: 11 | Status: SHIPPED | OUTPATIENT
Start: 2025-03-25

## 2025-03-25 RX ORDER — PROPRANOLOL HYDROCHLORIDE 40 MG/1
40 TABLET ORAL DAILY
Qty: 30 TABLET | Refills: 11 | Status: SHIPPED | OUTPATIENT
Start: 2025-03-25

## 2025-03-25 RX ORDER — HYDROCHLOROTHIAZIDE 25 MG/1
25 TABLET ORAL DAILY
Qty: 30 TABLET | Refills: 11 | Status: SHIPPED | OUTPATIENT
Start: 2025-03-25

## 2025-03-25 RX ORDER — METOPROLOL TARTRATE 50 MG
50 TABLET ORAL 2 TIMES DAILY
Qty: 60 TABLET | Refills: 11 | Status: SHIPPED | OUTPATIENT
Start: 2025-03-25

## 2025-03-25 RX ORDER — POTASSIUM CHLORIDE 750 MG/1
20 TABLET, EXTENDED RELEASE ORAL 3 TIMES DAILY
Qty: 180 TABLET | Refills: 11 | Status: SHIPPED | OUTPATIENT
Start: 2025-03-25

## 2025-03-25 ASSESSMENT — FIBROSIS 4 INDEX: FIB4 SCORE: 2.31

## 2025-03-25 ASSESSMENT — PATIENT HEALTH QUESTIONNAIRE - PHQ9: CLINICAL INTERPRETATION OF PHQ2 SCORE: 0

## 2025-03-25 NOTE — PROGRESS NOTES
"Subjective:     Елена Perla is a 89 y.o. female presents to discuss:     Chief Complaint   Patient presents with    Follow-Up     Verbal consent was acquired by the patient to use [employer] ambient listening note generation during this visit Yes   History of Present Illness    4-month follow-up.    Patient reports overall general sense of wellbeing.  Staying active.  Going to be painting her picket fence soon.  She notes that she has had a skin irritation on the right posterior shoulder just by her bra line.  She initially thought it was secondary to the new bra and an irritation.  She noticed that about a month ago.  However upon examination it does appear to be a keratosis.  -She notes she has been trying to scratch and pick off this \"scab\".  -She has attempted to alleviate the condition by applying Clearasil, but without success.     She maintains a healthy diet and ensures adequate hydration. She continues to take her prescribed vitamins and is seeking refills for all her medications. She reports no episodes of lightheadedness.    She mentioned she will be having an MRI on her back tomorrow which is ordered by her pain management provider.    We have reviewed her recent blood work.    She prefers 30-day medication refills over 90-day ones      ROS: : see above      Current Outpatient Medications:     amLODIPine (NORVASC) 10 MG Tab, Take 1 Tablet by mouth every day., Disp: 30 Tablet, Rfl: 11    potassium chloride ER (KLOR-CON) 10 MEQ tablet, Take 2 Tablets by mouth 3 times a day., Disp: 180 Tablet, Rfl: 11    metoprolol tartrate (LOPRESSOR) 50 MG Tab, Take 1 Tablet by mouth 2 times a day., Disp: 60 Tablet, Rfl: 11    hydroCHLOROthiazide 25 MG Tab, Take 1 Tablet by mouth every day., Disp: 30 Tablet, Rfl: 11    lisinopril (PRINIVIL) 40 MG tablet, Take 1 Tablet by mouth every day., Disp: 30 Tablet, Rfl: 11    propranolol (INDERAL) 40 MG Tab, Take 1 Tablet by mouth every day., Disp: 30 Tablet, Rfl: 11    " "Multiple Vitamins-Minerals (PRESERVISION AREDS PO), Take  by mouth., Disp: , Rfl:     VITAMIN A PO, Take  by mouth., Disp: , Rfl:     therapeutic multivitamin-minerals (THERAGRAN-M) TABS, Take 1 Tab by mouth every day., Disp: , Rfl:     Calcium Carb-Cholecalciferol (CALCIUM + D3) 600-200 MG-UNIT TABS, Take  by mouth. 5 daily, Disp: , Rfl:     Magnesium 250 MG TABS, Take  by mouth every day., Disp: , Rfl:     Cyanocobalamin (VITAMIN B-12) 5000 MCG SUBL, Place  under tongue every day., Disp: , Rfl:     Garlic 1000 MG CAPS, Take  by mouth every day., Disp: , Rfl:     Cholecalciferol (VITAMIN D3) 5000 UNITS CAPS, Take 1 Cap by mouth every day., Disp: , Rfl:     Omega-3 Fatty Acids (FISH OIL) 1200 MG CAPS, Take  by mouth. 5 caps daily, Disp: , Rfl:     amoxicillin (AMOXIL) 500 MG Cap, Take 1 Capsule by mouth 2 times a day., Disp: 20 Capsule, Rfl: 0    ketorolac (ACULAR) 0.5 % Solution, , Disp: , Rfl:     amoxicillin (AMOXIL) 500 MG Cap, Take 1 Capsule by mouth 2 times a day., Disp: 20 Capsule, Rfl: 0    Allergies   Allergen Reactions    Other Misc      percodan    Other reaction(s): dizziness, vomiting    Percodan  [Kdc:Yellow Dye+Aspirin+Oxycodone] Vomiting    Percodan-Varsha  [Oxycodone-Aspirin] Vomiting     Other reaction(s): Dizziness       Objective:   Vitals: /68   Pulse 62   Temp 36.8 °C (98.3 °F) (Temporal)   Resp 16   Ht 1.651 m (5' 5\")   Wt 55.8 kg (123 lb)   SpO2 96%   BMI 20.47 kg/m²    General: Alert, pleasant, NAD  HEENT: Normocephalic.  Neck supple.   Respiratory: no distress, no audible wheezing, RR -WNL  Skin: Warm, dry, no rashes.  Extremities: No leg edema. No discoloration  Neurological: Right upper extremity tremor  Psych:  Affect/mood is normal, judgement is good, memory is intact, grooming is appropriate.  Assessment/Plan:      1. Essential hypertension  Chronic.  Multidrug therapy.  She is compliant with her medications.  Denies any chest pain, shortness of breath, dizziness or leg " swelling.  Continue amlodipine at 10 mg daily, HCTZ 25 mg once daily, lisinopril 40 mg once daily, metoprolol 50 mg twice daily.  Follow-up 4 months.  - amLODIPine (NORVASC) 10 MG Tab; Take 1 Tablet by mouth every day.  Dispense: 30 Tablet; Refill: 11  - metoprolol tartrate (LOPRESSOR) 50 MG Tab; Take 1 Tablet by mouth 2 times a day.  Dispense: 60 Tablet; Refill: 11  - hydroCHLOROthiazide 25 MG Tab; Take 1 Tablet by mouth every day.  Dispense: 30 Tablet; Refill: 11  - lisinopril (PRINIVIL) 40 MG tablet; Take 1 Tablet by mouth every day.  Dispense: 30 Tablet; Refill: 11    2. Chronic bilateral low back pain with bilateral sciatica  3. Lumbosacral radiculopathy  Chronic.  Symptoms are stable.  Pending MRI tomorrow.  Followed by pain specialist.    4. Adjustment disorder with anxious mood  Chronic.  Symptoms have been stable with propranolol.  - propranolol (INDERAL) 40 MG Tab; Take 1 Tablet by mouth every day.  Dispense: 30 Tablet; Refill: 11    5. Essential tremor  Chronic.  Overall stable with propranolol.  Noticeable tremor in right hand.  - propranolol (INDERAL) 40 MG Tab; Take 1 Tablet by mouth every day.  Dispense: 30 Tablet; Refill: 11    6. Skin lesion  - Referral to Dermatology    7. Hypokalemia  Chronic, stable.  Continue potassium chloride 10 mEq 2 tablets 3 times a day.  - potassium chloride ER (KLOR-CON) 10 MEQ tablet; Take 2 Tablets by mouth 3 times a day.  Dispense: 180 Tablet; Refill: 11     Assessment & Plan  1. Keratosis.  The patient's keratosis appears benign and noncancerous but is causing discomfort due to its location. Cryotherapy was discussed as a potential treatment option, which could result in the keratosis scabbing and falling off within 10 to 14 days, although multiple sessions may be required.  She was advised to discontinue the use of Clearasil as it may be exacerbating the dryness and itchiness of the keratosis. Instead, she was recommended to apply Aquaphor ointment daily for 30  seconds to aid in skin healing and reduce irritation. She was also advised against picking at the keratosis. A referral to dermatology will be made for further evaluation and potential removal of the keratosis.    2. Medication Management.  She requested refills for her current medications. She prefers 30-day medication refills over 90-day supplies.     Follow-up  The patient will follow up in 4 months.    Return in about 4 months (around 7/25/2025).    {I have placed the above orders and discussed them with an approved delegating provider. The MA is performing the below orders under the direction of Dr. Tyshawn CASTAÑEDA    Health maintenance:    General Recommendations:   Smoking: recommend complete avoidance of all tobacco products  Alcohol: recommend limiting consumption  Physical Activity: goal is 30 min of moderate activity 5 times a week  Weight Management and Nutrition: high vegetable, high protein diet like the Mediterranean diet, portion control, avoid excessive sugars, Low Glycemic Index foods, adequate hydration, sleep.

## 2025-03-26 ENCOUNTER — HOSPITAL ENCOUNTER (OUTPATIENT)
Dept: RADIOLOGY | Facility: MEDICAL CENTER | Age: OVER 89
End: 2025-03-26
Attending: PHYSICAL MEDICINE & REHABILITATION
Payer: MEDICARE

## 2025-03-26 DIAGNOSIS — M54.16 LUMBAR RADICULOPATHY: ICD-10-CM

## 2025-03-26 DIAGNOSIS — M48.062 PSEUDOCLAUDICATION SYNDROME: ICD-10-CM

## 2025-03-26 PROCEDURE — 72148 MRI LUMBAR SPINE W/O DYE: CPT

## 2025-04-22 ENCOUNTER — OFFICE VISIT (OUTPATIENT)
Dept: MEDICAL GROUP | Facility: MEDICAL CENTER | Age: OVER 89
End: 2025-04-22
Payer: MEDICARE

## 2025-04-22 VITALS
WEIGHT: 122.8 LBS | DIASTOLIC BLOOD PRESSURE: 60 MMHG | TEMPERATURE: 97 F | RESPIRATION RATE: 16 BRPM | SYSTOLIC BLOOD PRESSURE: 144 MMHG | BODY MASS INDEX: 20.46 KG/M2 | OXYGEN SATURATION: 95 % | HEIGHT: 65 IN | HEART RATE: 71 BPM

## 2025-04-22 DIAGNOSIS — J30.89 ALLERGIC RHINITIS DUE TO OTHER ALLERGIC TRIGGER, UNSPECIFIED SEASONALITY: ICD-10-CM

## 2025-04-22 DIAGNOSIS — L57.0 KERATOSIS: ICD-10-CM

## 2025-04-22 DIAGNOSIS — L98.9 BENIGN SKIN LESION: ICD-10-CM

## 2025-04-22 PROCEDURE — 3077F SYST BP >= 140 MM HG: CPT | Performed by: NURSE PRACTITIONER

## 2025-04-22 PROCEDURE — 3078F DIAST BP <80 MM HG: CPT | Performed by: NURSE PRACTITIONER

## 2025-04-22 PROCEDURE — 99213 OFFICE O/P EST LOW 20 MIN: CPT | Mod: 25 | Performed by: NURSE PRACTITIONER

## 2025-04-22 PROCEDURE — 17000 DESTRUCT PREMALG LESION: CPT | Performed by: NURSE PRACTITIONER

## 2025-04-22 ASSESSMENT — FIBROSIS 4 INDEX: FIB4 SCORE: 2.31

## 2025-04-22 NOTE — PROGRESS NOTES
Subjective:     Елена Perla is a 89 y.o. female presents to discuss:     Chief Complaint   Patient presents with    Shoulder Pain     RIGHT SHOULDER HAS BEEN HURTING FOR A MONTH. JUST HAPPENED OUT OF NO WHERE     Verbal consent was acquired by the patient to use [employer] ambient listening note generation during this visit Yes   History of Present Illness  The patient presents for request to have skin lesion frozen off her right shoulder- discussed at last office visit.    A persistent cough, attributed to postnasal drip, is reported. She has not been using Flonase or any other nasal spray but expresses interest in trying an over-the-counter option. Gargling with salt water has not been attempted recently. Occasional eye discharge is noted, which she believes is due to excessive nose blowing.    A small skin lesion on the shoulder is present, which she would like to have removed. No call from dermatology has been received yet. The lesion has not been picked at, but it is irritated by the bra strap. She has never had anything frozen off before.    She has been receiving eye injections for approximately 3 years and is currently dealing with billing issues related to these treatments.      ROS: : see above      Current Outpatient Medications:     amLODIPine (NORVASC) 10 MG Tab, Take 1 Tablet by mouth every day., Disp: 30 Tablet, Rfl: 11    potassium chloride ER (KLOR-CON) 10 MEQ tablet, Take 2 Tablets by mouth 3 times a day., Disp: 180 Tablet, Rfl: 11    metoprolol tartrate (LOPRESSOR) 50 MG Tab, Take 1 Tablet by mouth 2 times a day., Disp: 60 Tablet, Rfl: 11    hydroCHLOROthiazide 25 MG Tab, Take 1 Tablet by mouth every day., Disp: 30 Tablet, Rfl: 11    lisinopril (PRINIVIL) 40 MG tablet, Take 1 Tablet by mouth every day., Disp: 30 Tablet, Rfl: 11    propranolol (INDERAL) 40 MG Tab, Take 1 Tablet by mouth every day., Disp: 30 Tablet, Rfl: 11    amoxicillin (AMOXIL) 500 MG Cap, Take 1 Capsule by mouth 2 times a  "day., Disp: 20 Capsule, Rfl: 0    ketorolac (ACULAR) 0.5 % Solution, , Disp: , Rfl:     amoxicillin (AMOXIL) 500 MG Cap, Take 1 Capsule by mouth 2 times a day., Disp: 20 Capsule, Rfl: 0    Multiple Vitamins-Minerals (PRESERVISION AREDS PO), Take  by mouth., Disp: , Rfl:     VITAMIN A PO, Take  by mouth., Disp: , Rfl:     therapeutic multivitamin-minerals (THERAGRAN-M) TABS, Take 1 Tab by mouth every day., Disp: , Rfl:     Calcium Carb-Cholecalciferol (CALCIUM + D3) 600-200 MG-UNIT TABS, Take  by mouth. 5 daily, Disp: , Rfl:     Magnesium 250 MG TABS, Take  by mouth every day., Disp: , Rfl:     Cyanocobalamin (VITAMIN B-12) 5000 MCG SUBL, Place  under tongue every day., Disp: , Rfl:     Garlic 1000 MG CAPS, Take  by mouth every day., Disp: , Rfl:     Cholecalciferol (VITAMIN D3) 5000 UNITS CAPS, Take 1 Cap by mouth every day., Disp: , Rfl:     Omega-3 Fatty Acids (FISH OIL) 1200 MG CAPS, Take  by mouth. 5 caps daily, Disp: , Rfl:     Allergies   Allergen Reactions    Other Misc      percodan    Other reaction(s): dizziness, vomiting    Percodan  [Kdc:Yellow Dye+Aspirin+Oxycodone] Vomiting    Percodan-Varsha  [Oxycodone-Aspirin] Vomiting     Other reaction(s): Dizziness       Objective:   Vitals: BP (!) 144/60   Pulse 71   Temp 36.1 °C (97 °F) (Temporal)   Resp 16   Ht 1.651 m (5' 5\")   Wt 55.7 kg (122 lb 12.7 oz)   SpO2 95%   BMI 20.43 kg/m²    General: Alert, pleasant, NAD  HEENT: Normocephalic.  Neck supple.   Respiratory: no distress, no audible wheezing, RR -WNL  Skin: Warm, dry, no rashes. X 1 small Keratosis on right posterior shoulder.  Extremities: No leg edema. No discoloration  Neurological: No tremors  Psych:  Affect/mood is normal, judgement is good, memory is intact, grooming is appropriate.  Assessment/Plan:      1. Benign skin lesion    2. Keratosis    3. Allergic rhinitis due to other allergic trigger, unspecified seasonality     Assessment & Plan  1. Allergic rhinitis  Reports persistent cough, " postnasal drip, and nasal symptoms.  Treatment plan: Advised to use saline nasal spray to clear inhaled dust or pollen and Flonase nasal spray to reduce inflammation and itching. Recommended to gargle with salt water twice daily to alleviate throat irritation and to use over-the-counter Claritin for eye and nasal symptoms. These treatments should be continued for 2 to 4 weeks.    2. Skin lesion on the shoulder  Lesion described as itchy and frequently irritated by bra strap.  Treatment plan: Cryotherapy procedure performed today to remove the lesion. Instructed to cover the area with a Band-Aid and avoid picking at it. The lesion should scab and fall off within 7 to 10 days. If the lesion persists after a month, a second cryotherapy session may be considered.    Follow-up: The patient will follow up on 07/24/2025.    PROCEDURE  Procedure: Cryotherapy for skin lesion on the shoulder    All questions were answered and agreement to proceed was given after the following Pre-Procedure details were reviewed:  - Risks and Benefits: Discussed the procedure, including the burning sensation during application and the expected outcome of lesion removal.  - Side effects: Explained the potential for a burning sensation during the procedure and the formation of a scab that will fall off in 7 to 10 days.  - Consent: Verbal consent obtained.    Intra-Procedure:  - Dressing: Applied a Band-Aid to cover the treated area.    Post-Procedure:  - Tolerance Level: Patient tolerated the procedure well without flinching.  - Home Care Instructions: Advised to leave the lesion alone, avoid picking at it, and cover it with a Band-Aid if necessary. Explained that the lesion will form a scab and fall off in 7 to 10 days. No salve or additional treatment required.    Return for keep follow up in July.    {I have placed the above orders and discussed them with an approved delegating provider. The MA is performing the below orders under the  direction of Dr. Tyshawn CASTAÑEDA    Health maintenance:    General Recommendations:   Smoking: recommend complete avoidance of all tobacco products  Alcohol: recommend limiting consumption  Physical Activity: goal is 30 min of moderate activity 5 times a week  Weight Management and Nutrition: high vegetable, high protein diet like the Mediterranean diet, portion control, avoid excessive sugars, Low Glycemic Index foods, adequate hydration, sleep.

## 2025-05-13 ENCOUNTER — OFFICE VISIT (OUTPATIENT)
Dept: MEDICAL GROUP | Facility: MEDICAL CENTER | Age: OVER 89
End: 2025-05-13
Payer: MEDICARE

## 2025-05-13 VITALS
DIASTOLIC BLOOD PRESSURE: 76 MMHG | HEART RATE: 64 BPM | OXYGEN SATURATION: 98 % | SYSTOLIC BLOOD PRESSURE: 136 MMHG | WEIGHT: 125.22 LBS | TEMPERATURE: 97.7 F | RESPIRATION RATE: 16 BRPM | HEIGHT: 65 IN | BODY MASS INDEX: 20.86 KG/M2

## 2025-05-13 DIAGNOSIS — L57.0 KERATOSIS: ICD-10-CM

## 2025-05-13 DIAGNOSIS — R23.4 ESCHAR: ICD-10-CM

## 2025-05-13 PROCEDURE — 3075F SYST BP GE 130 - 139MM HG: CPT | Performed by: STUDENT IN AN ORGANIZED HEALTH CARE EDUCATION/TRAINING PROGRAM

## 2025-05-13 PROCEDURE — 3078F DIAST BP <80 MM HG: CPT | Performed by: STUDENT IN AN ORGANIZED HEALTH CARE EDUCATION/TRAINING PROGRAM

## 2025-05-13 PROCEDURE — 99213 OFFICE O/P EST LOW 20 MIN: CPT | Performed by: STUDENT IN AN ORGANIZED HEALTH CARE EDUCATION/TRAINING PROGRAM

## 2025-05-13 ASSESSMENT — FIBROSIS 4 INDEX: FIB4 SCORE: 2.31

## 2025-05-13 NOTE — PROGRESS NOTES
"Subjective:     CC: shoulder lesion     HPI:   Елена presents today with    Patient presents for a acute visit for follow-up on right shoulder keratosis status post cryotherapy.  She is concerned that she may need more cryotherapy for complete resolution of her lesion.  On examination there is cryotherapy eschar about 10 mm in diameter      Health Maintenance:     ROS:  ROS    Objective:     Exam:  /76 (BP Location: Left arm, Patient Position: Sitting, BP Cuff Size: Small adult)   Pulse 64   Temp 36.5 °C (97.7 °F) (Temporal)   Resp 16   Ht 1.651 m (5' 5\") Comment: pt reported  Wt 56.8 kg (125 lb 3.5 oz)   SpO2 98%   BMI 20.84 kg/m²  Body mass index is 20.84 kg/m².    Physical Exam    10 mm cryotherapy eschar noted in the right shoulder without surrounding induration/inflammation/warmth/erythema to    Labs:     Assessment & Plan:     89 y.o. female with the following -     1. Keratosis  2. Eschar  Acute, stable  Last chart reviewed, keratosis on the right shoulder causing irritation of the right bra strap status post cryotherapy.  Patient presents for follow-up.  On examination there is expected eschar formation.  No evidence of infection.  At this time recommend patient to continue conservative management and to put topical Vaseline twice daily as needed.  Patient expect the eschar to fall off naturally in 3 to 4 weeks.  Patient to follow-up if there is any changes in her symptoms.  Acetaminophen ibuprofen as needed for symptoms      Return in about 4 weeks (around 6/10/2025) for Skin lesion.    Please note that this dictation was created using voice recognition software. I have made every reasonable attempt to correct obvious errors, but I expect that there are errors of grammar and possibly content that I did not discover before finalizing the note.        "

## 2025-06-04 ENCOUNTER — APPOINTMENT (OUTPATIENT)
Dept: MEDICAL GROUP | Facility: MEDICAL CENTER | Age: OVER 89
End: 2025-06-04
Payer: MEDICARE

## 2025-07-24 ENCOUNTER — OFFICE VISIT (OUTPATIENT)
Dept: MEDICAL GROUP | Facility: MEDICAL CENTER | Age: OVER 89
End: 2025-07-24
Payer: MEDICARE

## 2025-07-24 VITALS
OXYGEN SATURATION: 96 % | BODY MASS INDEX: 19.99 KG/M2 | SYSTOLIC BLOOD PRESSURE: 118 MMHG | TEMPERATURE: 97.4 F | HEART RATE: 64 BPM | HEIGHT: 65 IN | WEIGHT: 120 LBS | DIASTOLIC BLOOD PRESSURE: 72 MMHG | RESPIRATION RATE: 14 BRPM

## 2025-07-24 DIAGNOSIS — E87.6 HYPOKALEMIA: ICD-10-CM

## 2025-07-24 DIAGNOSIS — F43.22 ADJUSTMENT DISORDER WITH ANXIOUS MOOD: ICD-10-CM

## 2025-07-24 DIAGNOSIS — E78.5 DYSLIPIDEMIA: ICD-10-CM

## 2025-07-24 DIAGNOSIS — M54.41 CHRONIC BILATERAL LOW BACK PAIN WITH BILATERAL SCIATICA: Chronic | ICD-10-CM

## 2025-07-24 DIAGNOSIS — G25.0 ESSENTIAL TREMOR: ICD-10-CM

## 2025-07-24 DIAGNOSIS — G89.29 CHRONIC BILATERAL LOW BACK PAIN WITH BILATERAL SCIATICA: Chronic | ICD-10-CM

## 2025-07-24 DIAGNOSIS — M54.42 CHRONIC BILATERAL LOW BACK PAIN WITH BILATERAL SCIATICA: Chronic | ICD-10-CM

## 2025-07-24 DIAGNOSIS — M54.17 LUMBOSACRAL RADICULOPATHY: ICD-10-CM

## 2025-07-24 DIAGNOSIS — I10 ESSENTIAL HYPERTENSION: Primary | ICD-10-CM

## 2025-07-24 ASSESSMENT — FIBROSIS 4 INDEX: FIB4 SCORE: 2.34

## 2025-07-24 NOTE — PROGRESS NOTES
"Subjective:     HPI:     Елена Perla is a 89 y.o. female presents to discuss:   Chief Complaint   Patient presents with    Follow-Up     Verbal consent was acquired by the patient to use [employer] ambient listening note generation during this visit Yes     History of Present Illness  The patient presents for a routine checkup.    She reports no chest pain, dizziness, or leg swelling. She maintains a good hydration routine, refilling her bottles with [employer] water. She engages in deep breathing exercises before bedtime as advised by her son. She is active around the house and does everything outside. She has not experienced heart palpitations for a long time, only occasionally.    She has been attending dances at the [employer], although not frequently. She mentions that she had to miss a couple of times due to severe leg pain. However, she notes significant improvement following her last epidural injection. She has an upcoming appointment with Dr. Marino on 08/08/2025 to assess her progress with the exercises.    She also mentions that her ophthalmologist, Dr. Yan, will no longer be accepting [facility] after 11/07/2025. She received this information via mail and text message. She plans to visit the Medicare office on [facility] for assistance with this issue.    She enjoys attending dances at the [facility]. She does not consume alcohol or use tobacco.    ROS: : see above    Current Medications[1]    Allergies[2]    Objective:     Vitals: /72   Pulse 64   Temp 36.3 °C (97.4 °F) (Temporal)   Resp 14   Ht 1.651 m (5' 5\")   Wt 54.4 kg (120 lb)   SpO2 96%   BMI 19.97 kg/m²      General: Alert, pleasant, NAD  HEENT: Normocephalic.  Neck supple.   Respiratory: no distress, no audible wheezing, RR -WNL  Skin: Warm, dry, no rashes.  Extremities: No leg edema. No discoloration  Neurological: No tremors  Psych:  Affect/mood is normal, judgement is good, memory is intact, grooming is appropriate.  Results     "   Assessment/Plan:      1. Essential hypertension  Chronic. Multidrug therapy. She is compliant with her medications. Denies any chest pain, shortness of breath, dizziness or leg swelling. Continue amlodipine at 10 mg daily, HCTZ 25 mg once daily, lisinopril 40 mg once daily, metoprolol 50 mg twice daily. Follow-up 4 months.     2. Chronic bilateral low back pain with bilateral sciatica  3. Lumbosacral radiculopathy  Chronic.  Current symptoms are stable.  Able to perform ADLs without significantly being impacted by pain.  Followed by pain specialist.      4. Adjustment disorder with anxious mood  Chronic.  Symptoms have been stable with propranolol.    5. Essential tremor  Chronic.  Overall improved with propranolol.    6. Hypokalemia  Chronic.  Stable.  Continue potassium 10 mEq 2 tablets 3 times daily.    7. Dyslipidemia  Chronic.  Not on statin therapy.  Monitor annual lipids  Assessment & Plan  1. Routine checkup:  - Blood pressure readings are within the normal range.  - No chest pain, dizziness, or significant heart palpitations reported.  - Cardiac health appears stable, likely due to active lifestyle and good hydration habits.  - Advised to continue current medication regimen and maintain an active lifestyle around the house.  - Avoid high-risk activities such as climbing ladders.  - Stay hydrated, especially during the summer months.    2. Leg pain:  - Significant improvement in leg pain following the last epidural injection.  - Scheduled to see Dr. Marino on 08/08/2025 to evaluate progress with exercises.  - Exercises performed strong enough to cause mild discomfort, necessary for muscle conditioning.  - Advised to continue exercises to activate and strengthen muscles.    3. Eye care:  - Current ophthalmologist, Dr. Yan, will no longer accept her insurance after 11/07/2025.  - Advised to consider changing insurance to ensure continued care.  - Plans to visit the Medicare office on [facility] for  assistance with insurance change.    Follow-up:  - A follow-up visit is scheduled in 4 months.      Return in about 4 months (around 11/24/2025).    : secondary to the complexity of this patient's illnesses and their interactions.  See assessment and plan above for the comprehensive evaluation and management of the patient's acute and chronic concerns.  As the patient's PCP, I am the continued focal point for all health care services for the patient's needs and ongoing subsequent medical care.  All problems listed were discussed during the office visit, medications were evaluated and complexities were discussed, as well as plan for the future      General Recommendations:   Smoking: recommend complete avoidance of all tobacco products  Alcohol: recommend limiting consumption  Physical Activity: goal is 30 min of moderate activity 5 times a week  Weight Management and Nutrition: high vegetable, high protein diet like the Mediterranean diet, portion control, avoid excessive sugars, Low Glycemic Index foods, adequate hydration, sleep.     Please note that this dictation was created using voice recognition software. I have made every reasonable attempt to correct obvious errors, but I expect that there are errors of grammar and possibly content that I did not discover before finalizing the note.     Miya CASTAÑEDA         [1]   Current Outpatient Medications:     amLODIPine (NORVASC) 10 MG Tab, Take 1 Tablet by mouth every day., Disp: 30 Tablet, Rfl: 11    potassium chloride ER (KLOR-CON) 10 MEQ tablet, Take 2 Tablets by mouth 3 times a day., Disp: 180 Tablet, Rfl: 11    metoprolol tartrate (LOPRESSOR) 50 MG Tab, Take 1 Tablet by mouth 2 times a day., Disp: 60 Tablet, Rfl: 11    hydroCHLOROthiazide 25 MG Tab, Take 1 Tablet by mouth every day., Disp: 30 Tablet, Rfl: 11    lisinopril (PRINIVIL) 40 MG tablet, Take 1 Tablet by mouth every day., Disp: 30 Tablet, Rfl: 11    propranolol (INDERAL) 40 MG Tab,  Take 1 Tablet by mouth every day., Disp: 30 Tablet, Rfl: 11    amoxicillin (AMOXIL) 500 MG Cap, Take 1 Capsule by mouth 2 times a day., Disp: 20 Capsule, Rfl: 0    ketorolac (ACULAR) 0.5 % Solution, , Disp: , Rfl:     Multiple Vitamins-Minerals (PRESERVISION AREDS PO), Take  by mouth., Disp: , Rfl:     VITAMIN A PO, Take  by mouth., Disp: , Rfl:     therapeutic multivitamin-minerals (THERAGRAN-M) TABS, Take 1 Tab by mouth every day., Disp: , Rfl:     Calcium Carb-Cholecalciferol (CALCIUM + D3) 600-200 MG-UNIT TABS, Take  by mouth. 5 daily, Disp: , Rfl:     Magnesium 250 MG TABS, Take  by mouth every day., Disp: , Rfl:     Cyanocobalamin (VITAMIN B-12) 5000 MCG SUBL, Place  under tongue every day., Disp: , Rfl:     Garlic 1000 MG CAPS, Take  by mouth every day., Disp: , Rfl:     Cholecalciferol (VITAMIN D3) 5000 UNITS CAPS, Take 1 Cap by mouth every day., Disp: , Rfl:     Omega-3 Fatty Acids (FISH OIL) 1200 MG CAPS, Take  by mouth. 5 caps daily, Disp: , Rfl:   [2]   Allergies  Allergen Reactions    Other Misc      percodan    Other reaction(s): dizziness, vomiting    Percodan  [Kdc:Yellow Dye+Aspirin+Oxycodone] Vomiting    Percodan-Varsha  [Oxycodone-Aspirin] Vomiting     Other reaction(s): Dizziness